# Patient Record
Sex: FEMALE | Race: WHITE | NOT HISPANIC OR LATINO | URBAN - METROPOLITAN AREA
[De-identification: names, ages, dates, MRNs, and addresses within clinical notes are randomized per-mention and may not be internally consistent; named-entity substitution may affect disease eponyms.]

---
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
## 2024-01-02 ENCOUNTER — TELEPHONE (OUTPATIENT)
Dept: OPHTHALMOLOGY | Facility: CLINIC | Age: 19
End: 2024-01-02
Payer: COMMERCIAL

## 2024-01-02 ENCOUNTER — VIRTUAL VISIT (OUTPATIENT)
Dept: PSYCHOLOGY | Facility: CLINIC | Age: 19
End: 2024-01-02
Payer: COMMERCIAL

## 2024-01-02 DIAGNOSIS — F64.9 GENDER DYSPHORIA: ICD-10-CM

## 2024-01-02 DIAGNOSIS — F41.1 GAD (GENERALIZED ANXIETY DISORDER): Primary | ICD-10-CM

## 2024-01-02 PROCEDURE — 90834 PSYTX W PT 45 MINUTES: CPT | Mod: 95 | Performed by: SOCIAL WORKER

## 2024-01-02 ASSESSMENT — ANXIETY QUESTIONNAIRES
IF YOU CHECKED OFF ANY PROBLEMS ON THIS QUESTIONNAIRE, HOW DIFFICULT HAVE THESE PROBLEMS MADE IT FOR YOU TO DO YOUR WORK, TAKE CARE OF THINGS AT HOME, OR GET ALONG WITH OTHER PEOPLE: SOMEWHAT DIFFICULT
1. FEELING NERVOUS, ANXIOUS, OR ON EDGE: SEVERAL DAYS
GAD7 TOTAL SCORE: 6
7. FEELING AFRAID AS IF SOMETHING AWFUL MIGHT HAPPEN: SEVERAL DAYS
GAD7 TOTAL SCORE: 6
3. WORRYING TOO MUCH ABOUT DIFFERENT THINGS: SEVERAL DAYS
GAD7 TOTAL SCORE: 6
7. FEELING AFRAID AS IF SOMETHING AWFUL MIGHT HAPPEN: SEVERAL DAYS
5. BEING SO RESTLESS THAT IT IS HARD TO SIT STILL: NOT AT ALL
2. NOT BEING ABLE TO STOP OR CONTROL WORRYING: SEVERAL DAYS
4. TROUBLE RELAXING: SEVERAL DAYS
6. BECOMING EASILY ANNOYED OR IRRITABLE: SEVERAL DAYS
8. IF YOU CHECKED OFF ANY PROBLEMS, HOW DIFFICULT HAVE THESE MADE IT FOR YOU TO DO YOUR WORK, TAKE CARE OF THINGS AT HOME, OR GET ALONG WITH OTHER PEOPLE?: SOMEWHAT DIFFICULT

## 2024-01-02 ASSESSMENT — PATIENT HEALTH QUESTIONNAIRE - PHQ9
SUM OF ALL RESPONSES TO PHQ QUESTIONS 1-9: 6
SUM OF ALL RESPONSES TO PHQ QUESTIONS 1-9: 6
10. IF YOU CHECKED OFF ANY PROBLEMS, HOW DIFFICULT HAVE THESE PROBLEMS MADE IT FOR YOU TO DO YOUR WORK, TAKE CARE OF THINGS AT HOME, OR GET ALONG WITH OTHER PEOPLE: SOMEWHAT DIFFICULT

## 2024-01-02 NOTE — PROGRESS NOTES
"    [facility]                                     Progress Note    Patient Name: Roby Mendoza (Evelynn)kergiuliano  Date: 24         Service Type: Individual      Session Start Time: 9:05 AM  Session End Time: 9:55 AM     Session Length: 50 minutes    Session #: 1 with this provider    Attendees: Client attended alone    Service Modality:  Video Visit:      Provider verified identity through the following two step process.  Patient provided:  Patient  and Patient address    Telemedicine Visit: The patient's condition can be safely assessed and treated via synchronous audio and visual telemedicine encounter.      Reason for Telemedicine Visit: Services only offered telehealth    Originating Site (Patient Location): Patient's home    Distant Site (Provider Location): [facility] [place]    Consent:  The patient/guardian has verbally consented to: the potential risks and benefits of telemedicine (video visit) versus in person care; bill my insurance or make self-payment for services provided; and responsibility for payment of non-covered services.     Patient would like the video invitation sent by:  My Chart    Mode of Communication:  Video Conference via [facility]    Distant Location (Provider):  On-site    As the provider I attest to compliance with applicable laws and regulations related to telemedicine.    DATA  Extended Session (53+ minutes): No  Interactive Complexity: No  Crisis: No         Progress Since Last Session (Related to Symptoms / Goals / Homework):   Symptoms:    Patient has had anxiety showing up as physical manifestations. He reports \"over excessive worrying and mental pacing and fidgeting and stuff\". He reports taking medications for two weeks, which is helping. He explained that  when he's anxious it's more difficult to talk,, he looses words, and gets fidgety, which results in irritation. He also reports that he fixates on social " situations, replaying in his mind how he could have done something differently.    Homework:  N/A, first session with this provider      Episode of Care Goals: No improvement - PREPARATION (Decided to change - considering how); Intervened by negotiating a change plan and determining options / strategies for behavior change, identifying triggers, exploring social supports, and working towards setting a date to begin behavior change     Current / Ongoing Stressors and Concerns:   Stable job, but uncertainty about their future. Challenges within roommate and housing.     Patient shared: Unsure of what to do with imedate future in life. I'd like to when I'm 40-50 have my own homestead with my own animals. Jozef ow working on [employer] and passed 3 out fo 4 fassets, taking math classes to do the last one.  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 Been at first job for 6 years. Worked at [employer] briefly, too stressfeul.     Can't see the [place] being any more stable as a country in 20 years or even 5 years. Makes it discouraging to think about my future when I don't know if I will have rights as a transgender person. Or consumerism and having to work 3 jobs for top surgery.      Treatment Objective(s) Addressed in This Session:   Patient will explore thoughts and feelings related to surgery and actions he can take towards this if he chooses, as well as exploring how societal bias may be impacting anxiety around this.    Patient will explore possible paths and next steps in their life and any underlying anxious thoughts or beliefs that prevent them from making progress on these steps.    Patient will explore how housing and living situation is impacting their mental health and what beliefs they hold around this.    Patient will use at least 5 coping skills for anxiety management in the next 10 weeks.    Patient will implement self-care into their routine to decrease anxiety,  focusing on sleep hygeine, nutrition, movement, regular engagement in mindful activity, and regular socialization.      Intervention:   Building rapport, treatment planning, CBT:  Met with client and reviewed their symptoms and current stressors. Identified treatment goals. Reviewed coping skills utilized thus far. Introduced CBT worksheet to identify situation, feelings, and thoughts.       Assessments completed prior to visit:  The following assessments were completed by patient for this visit:  PHQ9:       2/28/2023     2:53 PM 4/11/2023     9:12 AM 10/19/2023     8:15 AM 12/18/2023     9:37 AM 1/2/2024     8:43 AM   PHQ-9 SCORE   PHQ-9 Total Score MyChart    9 (Mild depression) 6 (Mild depression)   PHQ-9 Total Score 10 17 6 9 6     GAD7:       10/19/2023     8:16 AM 12/18/2023    11:00 AM 1/2/2024     8:44 AM   LEMUEL-7 SCORE   Total Score   6 (mild anxiety)   Total Score 21 5 6     PROMIS 10-Global Health (only subscores and total score):       12/18/2023    10:17 AM 1/2/2024     8:45 AM   PROMIS-10 Scores Only   Global Mental Health Score 12 10   Global Physical Health Score 15 15   PROMIS TOTAL - SUBSCORES 27 25         ASSESSMENT: Current Emotional / Mental Status (status of significant symptoms):   Risk status (Self / Other harm or suicidal ideation)   Patient denies current fears or concerns for personal safety.   Patient denies current or recent suicidal ideation or behaviors.   Patient denies current or recent homicidal ideation or behaviors.   Patient denies current or recent self injurious behavior or ideation.   Patient denies other safety concerns.   Patient reports there has been no change in risk factors since their last session.     Patient reports there has been no change in protective factors since their last session.     Recommended that patient call 911 or go to the local ED should there be a change in any of these risk factors.     Appearance:   Appropriate    Eye Contact:   Good    Psychomotor  Behavior: Normal    Attitude:   Cooperative    Orientation:   All   Speech    Rate / Production: Normal/ Responsive    Volume:  Normal    Mood:    Anxious    Affect:    Appropriate    Thought Content:  Clear    Thought Form:  Coherent  Logical    Insight:    Good      Medication Review:   No changes to current psychiatric medication(s)     Medication Compliance:   Yes     Changes in Health Issues:   None reported     Chemical Use Review:   Substance Use: Chemical use reviewed, no active concerns identified      Tobacco Use: No current tobacco use.      Diagnosis:  1. LEMUEL (generalized anxiety disorder)    2. Gender dysphoria        Collateral Reports Completed:   Not Applicable    PLAN: (Patient Tasks / Therapist Tasks / Other)  Patient to review worksheet and try at least one more before next session  Provider to gather and send resources on patient's questions on surgery        ALIX COTE, LICSW                                                         ______________________________________________________________________    Individual Treatment Plan    Patient's Name: Roby Ramirez (Evelynn)  YOB: 2005    Date of Creation: 1/2/24  Date Treatment Plan Last Reviewed/Revised: 1/2/24    DSM5 Diagnoses: 300.02 (F41.1) Generalized Anxiety Disorder or 302.85 (F64.1) Gender dysphoria in Adolescents and Adults   Psychosocial / Contextual Factors: Stable job, but uncertainty about their future. Challenges within roommate and housing. Societal concerns around transgender healthcare  PROMIS (reviewed every 90 days): PROMIS 10-Global Health (only subscores and total score):       12/18/2023    10:17 AM 1/2/2024     8:45 AM   PROMIS-10 Scores Only   Global Mental Health Score 12 10   Global Physical Health Score 15 15   PROMIS TOTAL - SUBSCORES 27 25       Referral / Collaboration:  Referral to another professional/service is not indicated at this time..    Anticipated number of session for this episode of  care: 9-12 sessions  Anticipation frequency of session: Biweekly  Anticipated Duration of each session: 38-52 minutes  Treatment plan will be reviewed in 90 days or when goals have been changed.       MeasurableTreatment Goal(s) related to diagnosis / functional impairment(s)  Goal 1: Patient will reduce overall anxiety, including anxiety around surgery and finances, housing and roommate, and future career  Patient could not articulate how they could recognize progress, but simply was able to share more ways their anxiety shows up to help develop more specific goals.    Objective #A (Patient Action)    Patient will  explore thoughts and feelings related to surgery and actions he can take towards this if he chooses, as well as exploring how societal bias may be impacting anxiety around this .  Status: New - Date: 1/2/24      Intervention(s)  Therapist will provide resources for this and a safe space to explore thoughts and feelings.      Objective #B  Patient will  explore possible paths and next steps in their life and any underlying anxious thoughts or beliefs that prevent them from making progress on these steps .  Status: New - Date: 1/2/24      Intervention(s)  Therapist will  provide CBT worksheets and exploration of underlying anxious beliefs, as well as a space to explore possible future options. Therapist will also engaged in internal family systems therapy .    Objective #C  Patient will  explore how housing and living situation is impacting their mental health and what beliefs they hold around this .  Status: New - Date: 1/2/24      Intervention(s)  Therapist will  guide client with CBT and IFS to explore this .    Objective #D (Patient Action)    Patient will use at least 5 coping skills for anxiety management in the next 10 weeks.  Status: New - Date: 1/2/24     Intervention(s)  Therapist will teach coping skills and assign homework of practicing these.    Objective #E  Patient will implement self-care  into their routine to decrease anxiety, focusing on sleep hygeine, nutrition, movement, regular engagement in mindful activity, and regular socialization.   Status: New - Date: 1/2/24     Intervention(s)  Therapist will provide psychoeducation around the benefit of self-care and help client identify mindfulness based activities that may work for their life.      Patient has not reviewed nor agreed to the above plan.      B ALIX VILLA, LICSW  January 2, 2024

## 2024-01-04 ENCOUNTER — OFFICE VISIT (OUTPATIENT)
Dept: OPHTHALMOLOGY | Facility: CLINIC | Age: 19
End: 2024-01-04
Payer: COMMERCIAL

## 2024-01-04 DIAGNOSIS — H52.13 MYOPIA OF BOTH EYES: Primary | ICD-10-CM

## 2024-01-04 PROCEDURE — 92015 DETERMINE REFRACTIVE STATE: CPT | Performed by: OPTOMETRIST

## 2024-01-04 PROCEDURE — 92004 COMPRE OPH EXAM NEW PT 1/>: CPT | Performed by: OPTOMETRIST

## 2024-01-04 ASSESSMENT — TONOMETRY
OD_IOP_MMHG: 18
OS_IOP_MMHG: 18
IOP_METHOD: ICARE

## 2024-01-04 ASSESSMENT — REFRACTION
OD_CYLINDER: +0.50
OS_SPHERE: -2.50
OS_AXIS: 083
OD_AXIS: 087
OS_CYLINDER: +0.75
OD_SPHERE: -2.00

## 2024-01-04 ASSESSMENT — REFRACTION_MANIFEST
OD_CYLINDER: +0.50
OS_SPHERE: -2.75
OD_SPHERE: -2.00
OS_AXIS: 083
OS_CYLINDER: +0.75
OD_AXIS: 087

## 2024-01-04 ASSESSMENT — REFRACTION_WEARINGRX
OS_CYLINDER: +0.75
OD_AXIS: 091
OD_SPHERE: -1.75
OS_SPHERE: -1.75
OD_CYLINDER: +0.50
OS_AXIS: 091

## 2024-01-04 ASSESSMENT — CONF VISUAL FIELD
METHOD: COUNTING FINGERS
OS_SUPERIOR_NASAL_RESTRICTION: 0
OS_NORMAL: 1
OD_SUPERIOR_TEMPORAL_RESTRICTION: 0
OD_INFERIOR_TEMPORAL_RESTRICTION: 0
OD_NORMAL: 1
OS_INFERIOR_TEMPORAL_RESTRICTION: 0
OD_INFERIOR_NASAL_RESTRICTION: 0
OD_SUPERIOR_NASAL_RESTRICTION: 0
OS_SUPERIOR_TEMPORAL_RESTRICTION: 0
OS_INFERIOR_NASAL_RESTRICTION: 0

## 2024-01-04 ASSESSMENT — SLIT LAMP EXAM - LIDS
COMMENTS: NORMAL
COMMENTS: NORMAL

## 2024-01-04 ASSESSMENT — VISUAL ACUITY
OD_CC: 20/20
OS_CC+: -2
METHOD: SNELLEN - LINEAR
CORRECTION_TYPE: GLASSES
OD_CC+: -1
OS_CC: 20/25

## 2024-01-04 ASSESSMENT — EXTERNAL EXAM - RIGHT EYE: OD_EXAM: NORMAL

## 2024-01-04 ASSESSMENT — CUP TO DISC RATIO
OS_RATIO: 0.35
OD_RATIO: 0.25

## 2024-01-04 ASSESSMENT — EXTERNAL EXAM - LEFT EYE: OS_EXAM: NORMAL

## 2024-01-04 NOTE — NURSING NOTE
Chief Complaints and History of Present Illnesses   Patient presents with    Annual Eye Exam     Chief Complaint(s) and History of Present Illness(es)       Annual Eye Exam              Laterality: both eyes    Associated symptoms: Negative for redness, jaw claudication, photophobia and foreign body sensation    Pain scale: 0/10              Comments    Patient last exam was 9 years ago. Patient's glasss are that old. Patient deies any issues at this time.  [facility] January 4, 2024 1:37 PM

## 2024-01-04 NOTE — PROGRESS NOTES
History  HPI       Annual Eye Exam    In both eyes.  Associated symptoms include Negative for redness, jaw claudication, photophobia and foreign body sensation.  Pain was noted as 0/10.             Comments    Patient last exam was 9 years ago. Patient's glasss are that old. Patient deies any issues at this time.  [facility] Barnes January 4, 2024 1:37 PM           Last edited by Renee Kowalski on 1/4/2024  1:37 PM.          Assessment/Plan  (H52.13) Myopia of both eyes  (primary encounter diagnosis)  Comment: Myopia both eyes   Plan: REFRACTION         Educated patient on condition and clinical findings. Dispensed spectacle prescription for full time wear. Monitor at next comprehensive eye exam.    Ocular health normal on examination today.    Return to clinic in 2 years for comprehensive eye exam.    Complete documentation of historical and exam elements from today's encounter can  be found in the full encounter summary report (not reduplicated in this progress  note). I personally obtained the chief complaint(s) and history of present illness. I  confirmed and edited as necessary the review of systems, past medical/surgical  history, family history, social history, and examination findings as documented by  others; and I examined the patient myself. I personally reviewed the relevant tests,  images, and reports as documented above. I formulated and edited as necessary the  assessment and plan and discussed the findings and management plan with the  patient and family.    Eduar Shannon OD, FAAO

## 2024-02-05 ENCOUNTER — VIRTUAL VISIT (OUTPATIENT)
Dept: PSYCHOLOGY | Facility: CLINIC | Age: 19
End: 2024-02-05
Payer: COMMERCIAL

## 2024-02-05 DIAGNOSIS — F41.1 GAD (GENERALIZED ANXIETY DISORDER): Primary | ICD-10-CM

## 2024-02-05 DIAGNOSIS — F64.9 GENDER DYSPHORIA: ICD-10-CM

## 2024-02-05 PROCEDURE — 90834 PSYTX W PT 45 MINUTES: CPT | Mod: 95 | Performed by: SOCIAL WORKER

## 2024-02-05 ASSESSMENT — ANXIETY QUESTIONNAIRES
IF YOU CHECKED OFF ANY PROBLEMS ON THIS QUESTIONNAIRE, HOW DIFFICULT HAVE THESE PROBLEMS MADE IT FOR YOU TO DO YOUR WORK, TAKE CARE OF THINGS AT HOME, OR GET ALONG WITH OTHER PEOPLE: SOMEWHAT DIFFICULT
GAD7 TOTAL SCORE: 7
5. BEING SO RESTLESS THAT IT IS HARD TO SIT STILL: SEVERAL DAYS
7. FEELING AFRAID AS IF SOMETHING AWFUL MIGHT HAPPEN: SEVERAL DAYS
GAD7 TOTAL SCORE: 7
6. BECOMING EASILY ANNOYED OR IRRITABLE: SEVERAL DAYS
3. WORRYING TOO MUCH ABOUT DIFFERENT THINGS: SEVERAL DAYS
2. NOT BEING ABLE TO STOP OR CONTROL WORRYING: SEVERAL DAYS
GAD7 TOTAL SCORE: 7
1. FEELING NERVOUS, ANXIOUS, OR ON EDGE: SEVERAL DAYS
8. IF YOU CHECKED OFF ANY PROBLEMS, HOW DIFFICULT HAVE THESE MADE IT FOR YOU TO DO YOUR WORK, TAKE CARE OF THINGS AT HOME, OR GET ALONG WITH OTHER PEOPLE?: SOMEWHAT DIFFICULT
4. TROUBLE RELAXING: SEVERAL DAYS
7. FEELING AFRAID AS IF SOMETHING AWFUL MIGHT HAPPEN: SEVERAL DAYS

## 2024-02-05 ASSESSMENT — PATIENT HEALTH QUESTIONNAIRE - PHQ9
SUM OF ALL RESPONSES TO PHQ QUESTIONS 1-9: 4
10. IF YOU CHECKED OFF ANY PROBLEMS, HOW DIFFICULT HAVE THESE PROBLEMS MADE IT FOR YOU TO DO YOUR WORK, TAKE CARE OF THINGS AT HOME, OR GET ALONG WITH OTHER PEOPLE: SOMEWHAT DIFFICULT
SUM OF ALL RESPONSES TO PHQ QUESTIONS 1-9: 4

## 2024-02-05 NOTE — PROGRESS NOTES
[facility]                                     Progress Note    Patient Name: Roby Ramirez (Evelynn)  Date: 2/5/24         Service Type: Individual      Session Start Time: 1:40 PM  Session End Time: 2:20 PM     Session Length: 50 minutes    Session #: 2    Attendees: Client attended alone    Service Modality:  Video Visit:      Provider verified identity through the following two step process.  Patient provided:  Patient is known previously to provider    Telemedicine Visit: The patient's condition can be safely assessed and treated via synchronous audio and visual telemedicine encounter.      Reason for Telemedicine Visit: Patient convenience (e.g. access to timely appointments / distance to available provider)    Originating Site (Patient Location): Patient's other [facility]    Distant Site (Provider Location): Provider Remote Setting- Home Office    Consent:  The patient/guardian has verbally consented to: the potential risks and benefits of telemedicine (video visit) versus in person care; bill my insurance or make self-payment for services provided; and responsibility for payment of non-covered services.     Patient would like the video invitation sent by:  My Chart    Mode of Communication:  Video Conference via Amwell    Distant Location (Provider):  On-site    As the provider I attest to compliance with applicable laws and regulations related to telemedicine.    DATA  Extended Session (53+ minutes): No  Interactive Complexity: No  Crisis: No         Progress Since Last Session (Related to Symptoms / Goals / Homework):   Symptoms:  Patient has had some anxiety as he moved out of his last apartment.    Homework:  Progress made, some goals shifted due to changing life stressors  Patient to review worksheet and try at least one more before next session -not done  Provider to gather and send resources on patient's questions on surgery -done        Episode of  Care Goals: No improvement - PREPARATION (Decided to change - considering how); Intervened by negotiating a change plan and determining options / strategies for behavior change, identifying triggers, exploring social supports, and working towards setting a date to begin behavior change     Current / Ongoing Stressors and Concerns:   Stable job, but uncertainty about their future. Challenges within roommate and housing which led to moving out and in with father, but is applying for places with a new roommate.      Treatment Objective(s) Addressed in This Session:   Patient will explore thoughts and feelings related to surgery and actions he can take towards this if he chooses, as well as exploring how societal bias may be impacting anxiety around this.    Patient will explore possible paths and next steps in their life and any underlying anxious thoughts or beliefs that prevent them from making progress on these steps.    Patient will explore how housing and living situation is impacting their mental health and what beliefs they hold around this.    Patient will use at least 5 coping skills for anxiety management in the next 10 weeks.    Patient will implement self-care into their routine to decrease anxiety, focusing on sleep hygeine, nutrition, movement, regular engagement in mindful activity, and regular socialization.      Intervention:   CBT:  Processed moving out. 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 Talked about anxiety and how he's currently using distraction as a technique to manage it. Talked about some of the stressors with finances, slowing down and noticing how these worries were impacting him. Discussed how worry shows up for him, specifically that if he doesn't worry he will forget about something important.     Processed childhood, exploring emotions towards his mother with her kicking him out at 16 and how this impacted their relationship and his relationship with his  father. Explored family mental health history. Discussed missing his relationship with his grandmother and set goals around rebuilding this.       Assessments completed prior to visit:  The following assessments were completed by patient for this visit:  PHQ9:       2/28/2023     2:53 PM 4/11/2023     9:12 AM 10/19/2023     8:15 AM 12/18/2023     9:37 AM 1/2/2024     8:43 AM 2/5/2024     1:40 PM   PHQ-9 SCORE   PHQ-9 Total Score MyChart    9 (Mild depression) 6 (Mild depression) 4 (Minimal depression)   PHQ-9 Total Score 10 17 6 9 6 4     GAD7:       10/19/2023     8:16 AM 12/18/2023    11:00 AM 1/2/2024     8:44 AM 2/5/2024     1:40 PM   LEMUEL-7 SCORE   Total Score   6 (mild anxiety) 7 (mild anxiety)   Total Score 21 5 6 7     PROMIS 10-Global Health (only subscores and total score):       12/18/2023    10:17 AM 1/2/2024     8:45 AM   PROMIS-10 Scores Only   Global Mental Health Score 12 10   Global Physical Health Score 15 15   PROMIS TOTAL - SUBSCORES 27 25         ASSESSMENT: Current Emotional / Mental Status (status of significant symptoms):   Risk status (Self / Other harm or suicidal ideation)   Patient denies current fears or concerns for personal safety.   Patient denies current or recent suicidal ideation or behaviors.   Patient denies current or recent homicidal ideation or behaviors.   Patient denies current or recent self injurious behavior or ideation.   Patient denies other safety concerns.   Patient reports there has been no change in risk factors since their last session.     Patient reports there has been no change in protective factors since their last session.     Recommended that patient call 911 or go to the local ED should there be a change in any of these risk factors.     Appearance:   Appropriate    Eye Contact:   Good    Psychomotor Behavior: Normal    Attitude:   Cooperative    Orientation:   All   Speech    Rate / Production: Normal/ Responsive    Volume:  Normal    Mood:    Anxious     Affect:    Appropriate    Thought Content:  Clear    Thought Form:  Coherent  Logical    Insight:    Good      Medication Review:   No changes to current psychiatric medication(s)     Medication Compliance:   Yes     Changes in Health Issues:   None reported     Chemical Use Review:   Substance Use: Chemical use reviewed, no active concerns identified      Tobacco Use: No current tobacco use.      Diagnosis:  1. LEMUEL (generalized anxiety disorder)    2. Gender dysphoria      Collateral Reports Completed:   Not Applicable    PLAN: (Patient Tasks / Therapist Tasks / Other)  Call grandmother for connection  Look into state insurance  Patient to review worksheet and try at least one more before next session        ALIX COTE, [facility]    February 5, 2024                                                         ______________________________________________________________________    Individual Treatment Plan    Patient's Name: Roby Ramirez (Evelynn)  YOB: 2005    Date of Creation: 1/2/24  Date Treatment Plan Last Reviewed/Revised: 1/2/24    DSM5 Diagnoses: 300.02 (F41.1) Generalized Anxiety Disorder or 302.85 (F64.1) Gender dysphoria in Adolescents and Adults   Psychosocial / Contextual Factors: Stable job, but uncertainty about their future. Challenges within roommate and housing. Societal concerns around transgender healthcare  PROMIS (reviewed every 90 days): PROMIS 10-Global Health (only subscores and total score):       12/18/2023    10:17 AM 1/2/2024     8:45 AM   PROMIS-10 Scores Only   Global Mental Health Score 12 10   Global Physical Health Score 15 15   PROMIS TOTAL - SUBSCORES 27 25       Referral / Collaboration:  Referral to another professional/service is not indicated at this time..    Anticipated number of session for this episode of care: 9-12 sessions  Anticipation frequency of session: Biweekly  Anticipated Duration of each session: 38-52 minutes  Treatment plan will be  reviewed in 90 days or when goals have been changed.       MeasurableTreatment Goal(s) related to diagnosis / functional impairment(s)  Goal 1: Patient will reduce overall anxiety, including anxiety around surgery and finances, housing and roommate, and future career  Patient could not articulate how they could recognize progress, but simply was able to share more ways their anxiety shows up to help develop more specific goals.    Objective #A (Patient Action)    Patient will  explore thoughts and feelings related to surgery and actions he can take towards this if he chooses, as well as exploring how societal bias may be impacting anxiety around this .  Status: New - Date: 1/2/24      Intervention(s)  Therapist will provide resources for this and a safe space to explore thoughts and feelings.      Objective #B  Patient will  explore possible paths and next steps in their life and any underlying anxious thoughts or beliefs that prevent them from making progress on these steps .  Status: New - Date: 1/2/24      Intervention(s)  Therapist will  provide CBT worksheets and exploration of underlying anxious beliefs, as well as a space to explore possible future options. Therapist will also engaged in internal family systems therapy .    Objective #C  Patient will  explore how housing and living situation is impacting their mental health and what beliefs they hold around this .  Status: New - Date: 1/2/24      Intervention(s)  Therapist will  guide client with CBT and IFS to explore this .    Objective #D (Patient Action)    Patient will use at least 5 coping skills for anxiety management in the next 10 weeks.  Status: New - Date: 1/2/24     Intervention(s)  Therapist will teach coping skills and assign homework of practicing these.    Objective #E  Patient will implement self-care into their routine to decrease anxiety, focusing on sleep hygeine, nutrition, movement, regular engagement in mindful activity, and regular  socialization.   Status: New - Date: 1/2/24     Intervention(s)  Therapist will provide psychoeducation around the benefit of self-care and help client identify mindfulness based activities that may work for their life.      Patient has reviewed and agreed to the above plan.      B ALIX VILLA, [facility]  January 2, 2024

## 2024-02-09 ENCOUNTER — OFFICE VISIT (OUTPATIENT)
Dept: FAMILY MEDICINE | Facility: CLINIC | Age: 19
End: 2024-02-09
Payer: COMMERCIAL

## 2024-02-09 VITALS
HEIGHT: 67 IN | TEMPERATURE: 98.5 F | WEIGHT: 199.7 LBS | HEART RATE: 83 BPM | DIASTOLIC BLOOD PRESSURE: 82 MMHG | RESPIRATION RATE: 18 BRPM | SYSTOLIC BLOOD PRESSURE: 123 MMHG | BODY MASS INDEX: 31.34 KG/M2 | OXYGEN SATURATION: 97 %

## 2024-02-09 DIAGNOSIS — S43.402A SPRAIN OF LEFT SHOULDER, UNSPECIFIED SHOULDER SPRAIN TYPE, INITIAL ENCOUNTER: Primary | ICD-10-CM

## 2024-02-09 PROCEDURE — 99213 OFFICE O/P EST LOW 20 MIN: CPT | Performed by: FAMILY MEDICINE

## 2024-02-09 RX ORDER — CYCLOBENZAPRINE HCL 10 MG
10 TABLET ORAL 3 TIMES DAILY PRN
Qty: 15 TABLET | Refills: 0 | Status: SHIPPED | OUTPATIENT
Start: 2024-02-09 | End: 2024-09-06

## 2024-02-09 ASSESSMENT — ASTHMA QUESTIONNAIRES
QUESTION_3 LAST FOUR WEEKS HOW OFTEN DID YOUR ASTHMA SYMPTOMS (WHEEZING, COUGHING, SHORTNESS OF BREATH, CHEST TIGHTNESS OR PAIN) WAKE YOU UP AT NIGHT OR EARLIER THAN USUAL IN THE MORNING: ONCE OR TWICE
QUESTION_1 LAST FOUR WEEKS HOW MUCH OF THE TIME DID YOUR ASTHMA KEEP YOU FROM GETTING AS MUCH DONE AT WORK, SCHOOL OR AT HOME: A LITTLE OF THE TIME
QUESTION_2 LAST FOUR WEEKS HOW OFTEN HAVE YOU HAD SHORTNESS OF BREATH: THREE TO SIX TIMES A WEEK
QUESTION_4 LAST FOUR WEEKS HOW OFTEN HAVE YOU USED YOUR RESCUE INHALER OR NEBULIZER MEDICATION (SUCH AS ALBUTEROL): TWO OR THREE TIMES PER WEEK
ACT_TOTALSCORE: 18
QUESTION_5 LAST FOUR WEEKS HOW WOULD YOU RATE YOUR ASTHMA CONTROL: WELL CONTROLLED
ACT_TOTALSCORE: 18

## 2024-02-09 ASSESSMENT — PAIN SCALES - GENERAL: PAINLEVEL: SEVERE PAIN (7)

## 2024-02-09 NOTE — PROGRESS NOTES
"  Assessment & Plan     Sprain of left shoulder, unspecified shoulder sprain, initial encounter  Acute, likely sprain of trapezius, possibly SCM muscles. Given short duration, recommend rest, ice, stretches. Given severity of pain, OK to use muscle relaxants at night. Otherwise recommending PRN ibuprofen 600 mg PO q6H.   - REVIEW OF HEALTH MAINTENANCE PROTOCOL ORDERS  - cyclobenzaprine (FLEXERIL) 10 MG tablet  Dispense: 15 tablet; Refill: 0      Prescription drug management  I spent a total of 14 minutes on the day of the visit.   Time spent by me doing chart review, history and exam, documentation and further activities per the note      BMI  Estimated body mass index is 31.28 kg/m  as calculated from the following:    Height as of this encounter: 1.702 m (5' 7\").    Weight as of this encounter: 90.6 kg (199 lb 11.2 oz).   Weight management plan: Discussed healthy diet and exercise guidelines      See Patient Instructions    Lolis Ca is a 18 year old, presenting for the following health issues:  Neck Pain (Pt states that the pain is coming from the left ) and Shoulder Pain        2/9/2024     9:06 AM   Additional Questions   Roomed by Carlos ZHU   Accompanied by N/A     History of Present Illness       Reason for visit:  Neck/shoulder pain  Symptom onset:  1-2 weeks ago    He eats 2-3 servings of fruits and vegetables daily.He consumes 0 sweetened beverage(s) daily.He exercises with enough effort to increase his heart rate 10 to 19 minutes per day.  He exercises with enough effort to increase his heart rate 3 or less days per week.   He is taking medications regularly.       Concern - Neck/Shoulder Pain on the Left Side   Onset: 1-2 weeks   Description: Consistent pain, achy. Posterior neck down the trapezius.   Intensity: moderate  Progression of Symptoms:  worsening, now constant  Accompanying Signs & Symptoms: Pain, achy-ness, discomfort, lack of sleep   Previous history of similar problem: No " "  Precipitating factors:        Worsened by: Moving things into a storage unit (how initially started). Turning or looking up or down   Alleviating factors:        Improved by: Tried stretches, different sleep positions.   Therapies tried and outcome: None    Does not play sports, no history of neck problems, does not see a chiropractor. Denies any weakness in . No numbness or tingling.         Objective    /82 (BP Location: Right arm, Patient Position: Sitting, Cuff Size: Adult Large)   Pulse 83   Temp 98.5  F (36.9  C) (Oral)   Resp 18   Ht 1.702 m (5' 7\")   Wt 90.6 kg (199 lb 11.2 oz)   SpO2 97%   BMI 31.28 kg/m    Body mass index is 31.28 kg/m .  Physical Exam  Vitals reviewed.   Constitutional:       Appearance: Normal appearance.   Musculoskeletal:      Right shoulder: Normal.      Left shoulder: No swelling, tenderness or crepitus. Normal range of motion. Normal strength.      Cervical back: Normal range of motion and neck supple. Tenderness present. No rigidity. Pain with movement and muscular tenderness present. No spinous process tenderness.      Comments: Negative Gilman, Negative Neer's    Neurological:      Mental Status: He is alert.                  Signed Electronically by: ANASTASIA KEATING DO    "

## 2024-02-09 NOTE — PATIENT INSTRUCTIONS
Do not mix ibuprofen and naproxen or naprosyn. These brand names include motrin, aleve, advil. Take 400-600 mg by mouth every 4-6 hours. Eat with food as it can cause stomach upset.   You can take the muscle relaxants as needed for severe pain.

## 2024-03-06 DIAGNOSIS — F99 INSOMNIA DUE TO OTHER MENTAL DISORDER: ICD-10-CM

## 2024-03-06 DIAGNOSIS — F41.1 GENERALIZED ANXIETY DISORDER: ICD-10-CM

## 2024-03-06 DIAGNOSIS — F51.05 INSOMNIA DUE TO OTHER MENTAL DISORDER: ICD-10-CM

## 2024-03-07 RX ORDER — CLONIDINE HYDROCHLORIDE 0.1 MG/1
0.1 TABLET ORAL AT BEDTIME
Qty: 90 TABLET | Refills: 0 | Status: SHIPPED | OUTPATIENT
Start: 2024-03-07 | End: 2024-06-06

## 2024-03-07 NOTE — TELEPHONE ENCOUNTER
"Request for medication refill:  cloNIDine (CATAPRES) 0.1 MG tablet     Providers if patient needs an appointment and you are willing to give a one month supply please refill for one month and  send a letter/MyChart using \".SMILLIMITEDREFILL\" .smillimited and route chart to \"P [facility] \" (Giving one month refill in non controlled medications is strongly recommended before denial)    If refill has been denied, meaning absolutely no refills without visit, please complete the smart phrase \".smirxrefuse\" and route it to the \"P [facility] MED REFILLS\"  pool to inform the patient and the pharmacy.    Rajan Medina, [facility]      "

## 2024-03-13 ENCOUNTER — FCC EXTENDED DOCUMENTATION (OUTPATIENT)
Dept: PSYCHOLOGY | Facility: CLINIC | Age: 19
End: 2024-03-13
Payer: COMMERCIAL

## 2024-03-13 NOTE — PROGRESS NOTES
"                    Discharge Summary  Multiple Sessions    Client Name: Susan Ramirez MRN#: [id number] YOB: 2005      Intake / Discharge Date: 3/13/24      DSM5 Diagnoses: (Sustained by DSM5 Criteria Listed Above)  Diagnoses: 300.02 (F41.1) Generalized Anxiety Disorder  302.85 (F64.1) Gender dysphoria in Adolescents and Adults    Psychosocial & Contextual Factors:  single, lives with roommate, employed fulltime.   WHODAS 2.0 (12 item) Score: N/A, tool is no  longer being utilized          Presenting Concern:  Patient reported \"relating to anxiety disorder\"      Reason for Discharge:  Provider is leaving organization.       Disposition at Time of Last Encounter:   Comments:   Provider leaving, patient has not returned multiple phone calls or DNA Guidet message, Yub message has been viewed.       Risk Management:   Client denies a history of suicidal ideation, suicide attempts, self-injurious behavior, homicidal ideation, homicidal behavior, and and other safety concerns  Recommended that patient call 911 or go to the local ED should there be a change in any of these risk factors.      Referred To:  Patient may return to [facility] at anytime. Michelle Goldstein would be a good fit for this patient. Outside resources include [facility] and [facility]        ALIX COTE, [facility]   3/13/2024   "

## 2024-04-26 ENCOUNTER — TELEPHONE (OUTPATIENT)
Dept: PLASTIC SURGERY | Facility: CLINIC | Age: 19
End: 2024-04-26
Payer: COMMERCIAL

## 2024-04-26 DIAGNOSIS — F64.0 GENDER DYSPHORIA IN ADULT: Primary | ICD-10-CM

## 2024-04-26 NOTE — TELEPHONE ENCOUNTER
[facility]    Phone Message    May a detailed message be left on voicemail: yes     Reason for Call: Appointment Intake    Referring Provider Name: Self   Diagnosis and/or Symptoms: Gender Care Top Surgery Pronouns He/Him     Action Taken: Message routed to:  [facility] Care     Travel Screening: Not Applicable

## 2024-04-30 ENCOUNTER — TELEPHONE (OUTPATIENT)
Dept: FAMILY MEDICINE | Facility: CLINIC | Age: 19
End: 2024-04-30

## 2024-04-30 DIAGNOSIS — F64.9 GENDER DYSPHORIA: Primary | ICD-10-CM

## 2024-04-30 NOTE — TELEPHONE ENCOUNTER
[facility] phone call message- patient requesting to speak with PCP or provider:    PCP: Valentina Junior    Additional Comments: The patient would like a call back to discuss TOP surgery.    Is a call back needed? Yes    Patient informed that it may take up to 2 business days to hear back from PCP:Yes    OK to leave a message on voice mail? Yes    Primary language: English      needed? No    Call taken on April 30, 2024 at 9:54 AM by Sandra Gasca

## 2024-04-30 NOTE — CONFIDENTIAL NOTE
[facility] :  Care Coordination Note     SITUATION   Roby Ramirez (he/him) is a 18 year old adult who is receiving support for:  Appointment (Gender Care Top Surgery ) and Care Team  .    BACKGROUND     Pt is scheduled for a gender affirming mastectomy consult with Dr. Rhodes on 12/6/24.     ASSESSMENT     Surgery              C[facility] Assessment  [facility] ([facility] Enrollment: (P) Enrolled  Patient has a therapist: (P) No  Letter of support #1: (P) Requested  Surgery being considered: (P) Yes  Mastectomy: (P) Yes    Pt reports:   No nicotine or other gender affirming surgeries  PLAN          Nursing Interventions:       Follow-up plan:  1. Establish with  provider and obtain ARNAV De La Garza

## 2024-05-02 ENCOUNTER — TELEPHONE (OUTPATIENT)
Dept: PSYCHOLOGY | Facility: CLINIC | Age: 19
End: 2024-05-02
Payer: COMMERCIAL

## 2024-05-02 DIAGNOSIS — F64.0 GENDER DYSPHORIA IN ADULT: Primary | ICD-10-CM

## 2024-05-02 NOTE — TELEPHONE ENCOUNTER
Date: 2024    Client Name:  Susan Ramirez  Preferred Name: Roby  MRN: [id number]   : 2005  Age:  18 year old    Presenting Problem / Reason for Assessment:     Letter of support for upcoming surgery       Suggested Program:  TG    Interested in Couples/Family Therapy?  No    Any current or past legal concerns we would need to know about?:   No      Patient wishes to be contacted regarding Insurance benefits?:  No    Insurance Benefits to be evaluated.  Note will be entered when validated.    Please Verify Registration    Please send Welcome Packet and document date sent.

## 2024-06-04 ENCOUNTER — MYC REFILL (OUTPATIENT)
Dept: FAMILY MEDICINE | Facility: CLINIC | Age: 19
End: 2024-06-04
Payer: COMMERCIAL

## 2024-06-04 DIAGNOSIS — F41.1 GENERALIZED ANXIETY DISORDER: ICD-10-CM

## 2024-06-04 DIAGNOSIS — F51.05 INSOMNIA DUE TO OTHER MENTAL DISORDER: ICD-10-CM

## 2024-06-04 DIAGNOSIS — F99 INSOMNIA DUE TO OTHER MENTAL DISORDER: ICD-10-CM

## 2024-06-04 NOTE — TELEPHONE ENCOUNTER
"Request for medication refill: cloNIDine (CATAPRES) 0.1 MG tablet     Providers if patient needs an appointment and you are willing to give a one month supply please refill for one month and  send a letter/MyChart using \".SMILLIMITEDREFILL\" .smillimited and route chart to \"P [facility] \" (Giving one month refill in non controlled medications is strongly recommended before denial)    If refill has been denied, meaning absolutely no refills without visit, please complete the smart phrase \".smirxrefuse\" and route it to the \"P [facility] MED REFILLS\"  pool to inform the patient and the pharmacy.    Cristel Ramos, CMA    "

## 2024-06-06 RX ORDER — CLONIDINE HYDROCHLORIDE 0.1 MG/1
0.1 TABLET ORAL AT BEDTIME
Qty: 90 TABLET | Refills: 3 | Status: SHIPPED | OUTPATIENT
Start: 2024-06-06 | End: 2024-06-10

## 2024-06-06 RX ORDER — CLONIDINE HYDROCHLORIDE 0.1 MG/1
0.1 TABLET ORAL AT BEDTIME
Qty: 90 TABLET | Refills: 0 | OUTPATIENT
Start: 2024-06-06

## 2024-06-06 NOTE — TELEPHONE ENCOUNTER
"Request for medication refill:    Providers if patient needs an appointment and you are willing to give a one month supply please refill for one month and  send a letter/MyChart using \".SMILLIMITEDREFILL\" .smillimited and route chart to \"P [facility] \" (Giving one month refill in non controlled medications is strongly recommended before denial)    If refill has been denied, meaning absolutely no refills without visit, please complete the smart phrase \".smirxrefuse\" and route it to the \"P [facility] MED REFILLS\"  pool to inform the patient and the pharmacy.    Leticia Lopez RN      " 25F PMH anorexia, p/w feeling cold/fatigue. Pt states that she was feeling called tonight, took her temp and was low at 94, so called EMS. Occasional minimal nausea. Also states that she has been following w/ PMD Dr. Saenz and recent labs have noted "low WBC and low Na" slowly declining, unsure of exact numbers. Started on Na tablets. Her PMD presumes 2/2 eating disorder. Her psychiatrist increased zyprexa dose and started on 1mg klonopin qhs ~1w ago. Since then pt states she is waking up much later in the day. Denies HA, fevers, URI symptoms, SOB/CP, vomiting diarrhea, urinary complaints, black/bloody stool, LE pain/swelling.   When asked about mood she states "its ok." Denies SI/HI, toxic ingestions. Unknown LMP, occasionally irregular. 25F PMH anorexia, p/w feeling cold/fatigue. Pt states that she was feeling cold tonight, took her temp and was low at 94, so called EMS. Occasional minimal nausea. Also states that she has been following w/ PMD Dr. Saenz and recent labs have noted "low WBC and low Na" slowly declining, unsure of exact numbers. Started on Na tablets. Her PMD presumes 2/2 eating disorder. Her psychiatrist increased zyprexa dose and started on 1mg klonopin qhs ~1w ago. Since then pt states she is waking up much later in the day. Denies HA, fevers, URI symptoms, SOB/CP, vomiting diarrhea, urinary complaints, black/bloody stool, LE pain/swelling.   When asked about mood she states "its ok." Denies SI/HI, toxic ingestions. Unknown LMP, occasionally irregular.

## 2024-06-07 DIAGNOSIS — F41.1 GENERALIZED ANXIETY DISORDER: ICD-10-CM

## 2024-06-07 DIAGNOSIS — F51.05 INSOMNIA DUE TO OTHER MENTAL DISORDER: ICD-10-CM

## 2024-06-07 DIAGNOSIS — F99 INSOMNIA DUE TO OTHER MENTAL DISORDER: ICD-10-CM

## 2024-06-10 NOTE — TELEPHONE ENCOUNTER
Request for this medication sent to Dr. Junior, waiting for a reply.    Do not deny this medication until Dr. Junior has approved the other request please.    Leticia Lopez RN

## 2024-06-11 RX ORDER — CLONIDINE HYDROCHLORIDE 0.1 MG/1
0.1 TABLET ORAL AT BEDTIME
Qty: 90 TABLET | Refills: 0 | OUTPATIENT
Start: 2024-06-11

## 2024-06-20 ENCOUNTER — VIRTUAL VISIT (OUTPATIENT)
Dept: PSYCHOLOGY | Facility: CLINIC | Age: 19
End: 2024-06-20
Payer: COMMERCIAL

## 2024-06-20 DIAGNOSIS — F64.9 GENDER DYSPHORIA: ICD-10-CM

## 2024-06-20 PROCEDURE — 90791 PSYCH DIAGNOSTIC EVALUATION: CPT | Mod: 95

## 2024-06-20 NOTE — NURSING NOTE
Is the patient currently in the state of [place]? YES    Visit mode:VIDEO    If the visit is dropped, the patient can be reconnected by: VIDEO VISIT: Text to cell phone:   Telephone Information:   Mobile [phone]       Will anyone else be joining the visit? NO  (If patient encounters technical issues they should call [phone]    How would you like to obtain your AVS? MyChart    Are changes needed to the allergy or medication list? No    Are refills needed on medications prescribed by this physician? NO    Reason for visit: Consult    Grey FUENTES

## 2024-06-20 NOTE — PROGRESS NOTES
[facility]  Program in Human Sexuality  Department of Family Medicine & Community Health  [facility]   [address]               [place] [address]  Phone: [phone]  Fax: [phone]  [email or web address]    Transgender Diagnostic (TG) Diagnostic Assessment Interview      Date of Service: 6/20/24  Client Name: Roby Ramirez   YOB: 2005  Age: 18 year old  MRN:  [id number]  Gender/Gender Identity: Trans male  Pronouns: He/Him  Treating Provider: ROSY Currie, JORDEN  Program: TG  Type of Session: Assessment  Present in Session: Roby  Number of Minutes:  54    SERVICE MODALITY:  Video Visit:      Provider verified identity through the following two step process.  Patient provided:  Patient was verified at admission/transfer    Telemedicine Visit: The patient's condition can be safely assessed and treated via synchronous audio and visual telemedicine encounter.      Reason for Telemedicine Visit: Patient has requested telehealth visit and Patient convenience (e.g. access to timely appointments / distance to available provider)    Originating Site (Patient Location): Patient's home    Distant Site (Provider Location): Provider Remote Setting- Home Office    Consent:  The patient/guardian has verbally consented to: the potential risks and benefits of telemedicine (video visit) versus in person care; bill my insurance or make self-payment for services provided; and responsibility for payment of non-covered services.     Patient would like the video invitation sent by:  My Chart    Mode of Communication:  Video Conference via Amwell    Distant Location (Provider):  Off-site    As the provider I attest to compliance with applicable laws and regulations related to telemedicine.    Ethnicity:       Any relevant cultural/Gnosticism issues?   -     Referral Source   Therapist     Primary Complaint/Presenting Problem and  "Goals:  LOS top surgery - potential for long term therapy      ________________________________________________________________      Transgender Health Services  Program-Specific Information    History of gender dysphoria   When was the first time you felt your gender did not fit your sex assigned at birth?   Beginning of puberty was not on board with any of it. Was not having the cutesy stuff my biological mom put me in. Dresses, the way my hair looked, pictures.     What do you recall thinking/feeling about this? Did you tell anyone?   Just very irritated or finding ways to get around it. At school it wasn't really so much an issue. I was just very dissociated during that time. I only had the one best friend who knew all of this. When I was in high school I socially transitioned - I had all my friends using that name. I don't think a lot of teachers were using it. The time I did start telling them it was COVID and I ended up dropping out because of what was going on at home. Yeah, it gradually kind of happened and one day I was like \"I would use this name if I didn't use this\". Coming out to dad (15/16) - with friends (13/14)  he didn't really use it until we lived together. LILLIAN Dad came out to khalil and poly.     What are your earliest memories of how you felt about your birth assigned sex/gender?   No issues at current school     What messages did you receive from others (family, friends, classmates) about gender?   My grandparents on my dad's side are supportive and my uncles and their kids, as well as my dad. Mom's side is whose knows - went no contact 3 years. Immense relief that no contact is in place.     When did you start to explore your gender through different gender expression? Dress, grooming, gender  play ?   Into the beginning of middle school and early on is when my mom stopped trying to fit me into a box so that's when I started to dress more androgynous.     Body Image/Anatomical dysphoria:  How " do you feel about your body?   Pretty neutral. I like the things that testosterone is doing and I just want to be rid of these. Been on HRT for a little under 2 years.     What was puberty like for you? When did you start puberty, and how did you feel about it at the time?   Awful.    What were your reactions to your secondary sex characteristics (breasts, menses, pubic hair, facial hair, voice deepening)?  Awful.    How do you feel about your genitals? Self-mutilation?   Yes, but don't want surgery.     What would you like to see changed?  Top surgery.    What medical interventions are you interested in for transition, if any? What questions do you have about these?  Top surgery.     Does dysphoria impact your ability to be sexual?   Yes.     Social Supports:   Who are your social supports?  Job - specifically supervisors and , dad, step mom, and my household - my best friend / their fiance - rest of friend [place].    Have you disclosed to family/friends?   Yes.     Do you know any Trans people?   Yes.     How have family/friends responded?  Josi, a couple months after the divorce her mother, my grandmother stopped calling. She's a very old woman.     Any employment concerns around gender?   No.    Internalized transphobia:  Negative experiences with being transgender?  Casual slurs on bus. When I was younger there was a lot of push on like you aren't really trans if you don't have dysphoria or if you don't do XYZ.    Feelings about the transgender community?  Very positive, I was pretty in my own under a rock as a young person and it wasn't until the past two years that I've met a lot of people.     How do you feel about your transgender identity?  Positive.     Relevant Sexuality Information:  How do you feel gender impacts your sexuality?  Yes.     Any sexual functioning concerns? Desire, arousal, orgasm, pain?   No.     Do you feel gender dysphoria impacts your sexuality? How?  Yes.  "    Sexual Orientation Questions or concerns?   Ace / [place] leaning towards foster.    _________________________________________________________________________      Mental Health History   Generalized Anxiety Disorder / Family hx of Schizophrenia      Substance Use:   Rarely - if so it's social.      Medical History   No    Family History   Parent: Amandeep, 31yo. Neurodivergence / Depression  Parent: Josi, almost or around 31yo. - Bipolar Disorder. /  Schizophrenia  Step Parent: Mary Ann, early 30's.    Dad: Pretty good right now, we're not very good at communication - close with Dad.   Josi: no contact for past 2-3 years. Strained, resentment, there were periods where we got along but it was overshadowed by other dynamics.     Roommates: living with best friend and fiance - best friend also has a sibling and they're like my sibling. They have kids so I'm kind of like their uncle.     Educational History   GED right now  [employer]    Occupational history  [employer].      Legal Issues:  None    _________________________________________________________________________    CONCLUSIONS    Strengths and Liabilities:       Symptoms:      Mental Status   Appearance:  No apparent distress, Casually dressed, Well groomed, and Appears stated age  Behavior/relationship to examiner/demeanor:  Cooperative, Engaged, and Pleasant  Orientation: Oriented to person, place, time, and situation  Speech Rate:  Normal  Speech Spontaneity:  Normal  Mood:  \"good\"  Affect:  Appropriate/mood-congruent  Thought Process (Associations):  Logical, Linear, and Goal directed  Thought Content:  no evidence of suicidal or homicidal ideation, no overt psychosis, denies suicidal ideation, intent or thoughts, and patient denies auditory and visual hallucinations  Abnormal Perception:  None  Attention/Concentration:  Normal  Language:  Intact  Insight:  Good  Judgment:  Good      DSM-5 Diagnosis:  F64.0 Gender Dysphoria    Interactive " Complexity  Communication difficulties present during current the psychiatric procedure included:  N/A

## 2024-06-23 ENCOUNTER — HEALTH MAINTENANCE LETTER (OUTPATIENT)
Age: 19
End: 2024-06-23

## 2024-07-17 ENCOUNTER — OFFICE VISIT (OUTPATIENT)
Dept: PSYCHOLOGY | Facility: CLINIC | Age: 19
End: 2024-07-17
Payer: COMMERCIAL

## 2024-07-17 DIAGNOSIS — F64.9 GENDER DYSPHORIA: Primary | ICD-10-CM

## 2024-07-17 PROCEDURE — 90834 PSYTX W PT 45 MINUTES: CPT

## 2024-07-17 NOTE — PROGRESS NOTES
[facility] - Progress Note    Date of Service: 24   Name: Roby Ramirez  : 2005  Medical Record Number: [id number]  Treating Provider: JORDEN Bhatt  Type of Session: Individual  Present in Session: Roby  Session Start and Stop Time: 902  Number of Minutes:  48    SERVICE MODALITY:  In-person    DSM-5 Diagnoses:  F64.0 Gender Dysphoria in Adults    Current Reported Symptoms and Status update:  Gender Dysphoria  Incongruence between birth assigned sex and experienced/expressed gender  for longer than six months, as manifested by: 1) incongruence between experienced/expressed gender and primary/secondary sex characteristics, 2) strong desire to be rid of primary/secondary sex characteristics; 3) strong desire to be gender other than birth-assigned sex.     Progress Toward Treatment Goals:   Stable/Maintenance of progress     Therapeutic Interventions/Treatment Strategies:    Area(s) of treatment focus addressed in this session included Maintenance of Progress    Relational and interpersonal approach to exploring thoughts, feelings, and emotions surrounding X and X. CLIENT reports X of X symptoms. NAME shared narrative regarding X. Remainder of time was focused on X. Writer validated, reflected, and normalized client's thoughts, feelings, and emotions throughout session. Client was open, responsive, and engaged throughout session.     Psychotherapist offered support, feedback and validation and reinforced use of skills Treatment modalities used include Solution Focused Therapy Gender Affirming Care discussed gender literacy and facilitated discussion about medical interventions  Support, Feedback, and Education    Patient Response:   Patient responded to session by accepting feedback, listening, focusing on goals, verbalizing understanding, and actively engaged  Possible barriers to participation / learning include: contextual issues, system oppression, and  political/world events worsening symptoms    Current Mental Status Exam:   Appearance:  Appropriate   Eye Contact:  Good   Attitude / Demeanor: Cooperative  Attentive  Speech      Rate / Production: Normal/ Responsive      Volume:  Normal  volume  Orientation:  All  Mood:   Normal  Affect:   Appropriate   Thought Content: Clear   Insight:   Good       Plan/Need for Future Services:  Return for therapy as needed to treat diagnosed problems.    Patient has See Epic Treatment Plan - Patient is discharging.    Referral / Collaboration:  The following referral(s) will be initiated: varying surgeons for chest mascuilnization .  Emergency Services Needed?  No    Assignment:  None    Interactive Complexity:  There are four specific communication difficulties that complicate the work of the primary psychiatric procedure.  Interactive complexity (+[id number]) may be reported when at least one of these difficulties is present.    Communication difficulties present during current the psychiatric procedure include:  None.      Signature/Title:      Anabel Johnson, ALICESW

## 2024-09-03 ASSESSMENT — ASTHMA QUESTIONNAIRES
ACT_TOTALSCORE: 13
QUESTION_4 LAST FOUR WEEKS HOW OFTEN HAVE YOU USED YOUR RESCUE INHALER OR NEBULIZER MEDICATION (SUCH AS ALBUTEROL): TWO OR THREE TIMES PER WEEK
QUESTION_3 LAST FOUR WEEKS HOW OFTEN DID YOUR ASTHMA SYMPTOMS (WHEEZING, COUGHING, SHORTNESS OF BREATH, CHEST TIGHTNESS OR PAIN) WAKE YOU UP AT NIGHT OR EARLIER THAN USUAL IN THE MORNING: TWO OR THREE NIGHTS A WEEK
QUESTION_1 LAST FOUR WEEKS HOW MUCH OF THE TIME DID YOUR ASTHMA KEEP YOU FROM GETTING AS MUCH DONE AT WORK, SCHOOL OR AT HOME: SOME OF THE TIME
QUESTION_5 LAST FOUR WEEKS HOW WOULD YOU RATE YOUR ASTHMA CONTROL: SOMEWHAT CONTROLLED
ACT_TOTALSCORE: 13
QUESTION_2 LAST FOUR WEEKS HOW OFTEN HAVE YOU HAD SHORTNESS OF BREATH: ONCE A DAY

## 2024-09-06 ENCOUNTER — OFFICE VISIT (OUTPATIENT)
Dept: FAMILY MEDICINE | Facility: CLINIC | Age: 19
End: 2024-09-06
Payer: COMMERCIAL

## 2024-09-06 VITALS
HEIGHT: 67 IN | WEIGHT: 214.6 LBS | BODY MASS INDEX: 33.68 KG/M2 | SYSTOLIC BLOOD PRESSURE: 125 MMHG | OXYGEN SATURATION: 98 % | TEMPERATURE: 98.5 F | RESPIRATION RATE: 18 BRPM | DIASTOLIC BLOOD PRESSURE: 84 MMHG | HEART RATE: 79 BPM

## 2024-09-06 DIAGNOSIS — F41.1 GENERALIZED ANXIETY DISORDER: ICD-10-CM

## 2024-09-06 DIAGNOSIS — F64.9 GENDER DYSPHORIA: Primary | ICD-10-CM

## 2024-09-06 DIAGNOSIS — F51.05 INSOMNIA DUE TO OTHER MENTAL DISORDER: ICD-10-CM

## 2024-09-06 DIAGNOSIS — Z00.00 HEALTHCARE MAINTENANCE: ICD-10-CM

## 2024-09-06 DIAGNOSIS — L70.0 ACNE VULGARIS: ICD-10-CM

## 2024-09-06 DIAGNOSIS — R09.81 NASAL CONGESTION: ICD-10-CM

## 2024-09-06 DIAGNOSIS — R06.83 SNORING: ICD-10-CM

## 2024-09-06 DIAGNOSIS — J45.40 MODERATE PERSISTENT ASTHMA WITHOUT COMPLICATION: ICD-10-CM

## 2024-09-06 DIAGNOSIS — F99 INSOMNIA DUE TO OTHER MENTAL DISORDER: ICD-10-CM

## 2024-09-06 PROCEDURE — 90651 9VHPV VACCINE 2/3 DOSE IM: CPT | Mod: SL

## 2024-09-06 PROCEDURE — 90673 RIV3 VACCINE NO PRESERV IM: CPT | Mod: SL

## 2024-09-06 PROCEDURE — 99214 OFFICE O/P EST MOD 30 MIN: CPT | Mod: 25

## 2024-09-06 PROCEDURE — 90471 IMMUNIZATION ADMIN: CPT | Mod: SL

## 2024-09-06 PROCEDURE — 90677 PCV20 VACCINE IM: CPT | Mod: SL

## 2024-09-06 PROCEDURE — 90472 IMMUNIZATION ADMIN EACH ADD: CPT | Mod: SL

## 2024-09-06 RX ORDER — CLONIDINE HYDROCHLORIDE 0.1 MG/1
0.1 TABLET ORAL 2 TIMES DAILY
Qty: 90 TABLET | Refills: 3 | Status: SHIPPED | OUTPATIENT
Start: 2024-09-06

## 2024-09-06 RX ORDER — FLUTICASONE PROPIONATE 50 MCG
1 SPRAY, SUSPENSION (ML) NASAL DAILY
Qty: 11.1 ML | Refills: 3 | Status: SHIPPED | OUTPATIENT
Start: 2024-09-06 | End: 2024-09-16

## 2024-09-06 RX ORDER — TRETINOIN 0.5 MG/G
CREAM TOPICAL AT BEDTIME
Qty: 45 G | Refills: 0 | Status: SHIPPED | OUTPATIENT
Start: 2024-09-06 | End: 2024-09-16

## 2024-09-06 NOTE — PROGRESS NOTES
Assessment & Plan     Gender affirmation   Patient initiated mHRT 3/2023 with topical testosterone. Labs 12/2023 with mild HLD, otherwise reassuring. Reports therapy going well. Bleeding resolved. Interested in voice training. Referral placed today.   - Comprehensive Gender Care Referral; Future  - Continue Androgel to 3 pumps daily   - Return 12/2023 for HRT labs     Acne vulgaris  Ongoing since testosterone therapy as above. Both comedomal and papulopustular. Will start agents below per algorithm. Reevaluate for need for antibiotic if not improving.   - START benzoyl peroxide 5 % external liquid; Apply topically daily.  - START tretinoin (RETIN-A) 0.05 % external cream; Apply topically at bedtime  - Return in 4 weeks for recheck; add clindamycin if not improving     Nasal congestion  Reports nasal congestion in the morning. Not seasonal and no improvement with allergy medications. No URI symptoms. Exam unremarkable. Will trial Flonase for improvement- may be related to asthma as below.   - START fluticasone (FLONASE) 50 MCG/ACT nasal spray; Spray 1 spray into both nostrils daily.  - Return in 4 weeks for recheck    Asthma  Low ACT score. Patient reports they were out of Albuterol for a while and feel this contributed. If not improving with Albuterol, would escalate per SMART therapy.   - Restart Albuterol PRN   - Return in 4 weeks for recheck; plan for ACT at that time     Snoring  STOP BANG 4. Referred for sleep study.   - Adult Sleep Eval & Management Referral; Future    Generalized anxiety disorder  Insomnia due to other mental disorder  Family history of bipolar disorder   Longstanding anxiety and family history of bipolar disorder. Reports improvement since initiating Clonidine. Interested in adding AM dose as previously suggested by Dr. Tirado. Not interested in therapy. No SI/HI.   - INCREASE Clonidine 0.1 mg BID  - Return in 4 weeks for recheck    Healthcare maintenance  Vaccines today.   - Pneumococcal  "20 Valent Conjugate (Prevnar 20)  - HPV9 (GARDASIL 9)  - INFLUENZA VACCINE TRIVALENT(FLUBLOK)    Subjective   Roby is a 18 year old, presenting for the following health issues:    HPI     GAHT:  - Last evaluated 12/2023. Labs with elevated lipids, otherwise reassuring.   - Initiated mHRT 3/2023 with topical testosterone.   - Interested in voice training- wants referral   - Last time still have menses and potentially interested in Mirena/Nexplanon. This has pretty much stopped- only happens     Acne:  - On topical T   - Has noticed since being on T  - Face and back   - Both comedomal and pustular   - Has tried Cerave bar soap     Congestion:  - Has been ongoing for a long time   - COVID tests are always negative   - Always in the morning   - Feels in throat/face  - Also has asthma. Uses albuterol PRN. Was out for a while   - No fevers, cough, sore throat, SOB  - Has tried allergy medications without significant difference   - Worried about YOSI due to family history   - All throughout the year   - STOP BANG 4     Mood:  - Met with Dr. Tirado previously. Could add AM dose of Clonidine if symptoms uncontrolled.   - Current regimen: Clonidine 0.1 mg bedtime and therapy   - Wants to try and increase dose         Objective    /84   Pulse 79   Temp 98.5  F (36.9  C) (Oral)   Resp 18   Ht 1.702 m (5' 7\")   Wt 97.3 kg (214 lb 9.6 oz)   SpO2 98%   BMI 33.61 kg/m    Body mass index is 33.61 kg/m .  Physical Exam  Vitals reviewed.   Constitutional:       General: He is not in acute distress.     Appearance: Normal appearance.   HENT:      Head: Normocephalic and atraumatic.   Cardiovascular:      Rate and Rhythm: Normal rate and regular rhythm.      Heart sounds: No murmur heard.  Pulmonary:      Effort: Pulmonary effort is normal. No respiratory distress.      Breath sounds: No wheezing.   Musculoskeletal:      Right lower leg: No edema.      Left lower leg: No edema.   Skin:     General: Skin is warm and dry.    "   Capillary Refill: Capillary refill takes less than 2 seconds.      Comments: Bilateral cheeks with mainly comedomal acne, patient reports intermittent pustular acne.    Neurological:      General: No focal deficit present.      Mental Status: He is alert.   Psychiatric:         Mood and Affect: Mood normal.         Behavior: Behavior normal.                    Signed Electronically by: Valentina Junior MD

## 2024-09-06 NOTE — PATIENT INSTRUCTIONS
Patient Education   Here is the plan from today's visit    FOR VOICE TRAINING:  - REFERRAL PLACED; THEY SHOULD CALL YOU    FOR ACNE:  - START BENZOYL PEROXIDE ONCE DAILY   - START RETINOID AT NIGHT   - CHECK IN ONE MONTH; SEE IF WE NEED ANTIBIOTIC     FOR CONGESTION:  - START FLONASE   - SLEEP STUDY REFERRAL   - MONITOR ASTHMA SYMPTOMS WITH REGULAR ALBUTEROL USE   - COME BACK IN ONE MONTH    FOR ANXIETY:   - INCREASE CLONIDINE TO TWICE DAILY     Please call or return to clinic if your symptoms don't go away.    Follow up plan  No follow-ups on file.    Thank you for coming to [facility] today.  Lab Testing:  **If you had lab testing today and your results are reassuring or normal they will be mailed to you or sent through [employer] within 7 days.   **If the lab tests need quick action we will call you with the results.  **If you are having labs done on a different day, please call [phone] to schedule at [facility] or [phone] for other [facility] locations. Labs do not offer walk-in appointments.  The phone number we will call with results is # [phone] (home) . If this is not the best number please call our clinic and change the number.  Medication Refills:  If you need any refills please call your pharmacy and they will contact us.   If you need to  your refill at a new pharmacy, please contact the new pharmacy directly. The new pharmacy will help you get your medications transferred faster.   Scheduling:  If you have any concerns about today's visit or wish to schedule another appointment please call our office during normal business hours [phone] (8-5:00 M-F). If you can no longer make a scheduled visit, please cancel via [employer] or call us to cancel.   If a referral was made to an [facility] specialty provider and you do not get a call from central scheduling, please refer to directions on your visit summary or call our office during normal business hours  for assistance.   If a Mammogram was ordered for you at the [facility] call [phone] to schedule or change your appointment.  If you had an XRay/CT/Ultrasound/MRI ordered the number is [phone] to schedule or change your radiology appointment.   [facility] has limited ultrasound appointments available on Wednesdays, if you would like your ultrasound at [facility], please call [phone] to schedule.   Medical Concerns:  If you have urgent medical concerns please call [phone] at any time of the day.    Valentina Junior MD

## 2024-09-14 ENCOUNTER — MYC MEDICAL ADVICE (OUTPATIENT)
Dept: FAMILY MEDICINE | Facility: CLINIC | Age: 19
End: 2024-09-14
Payer: COMMERCIAL

## 2024-09-14 DIAGNOSIS — L70.0 ACNE VULGARIS: ICD-10-CM

## 2024-09-14 DIAGNOSIS — R09.81 NASAL CONGESTION: ICD-10-CM

## 2024-09-16 RX ORDER — FLUTICASONE PROPIONATE 50 MCG
1 SPRAY, SUSPENSION (ML) NASAL DAILY
Qty: 11.1 ML | Refills: 3 | Status: SHIPPED | OUTPATIENT
Start: 2024-09-16

## 2024-09-16 RX ORDER — TRETINOIN 0.5 MG/G
CREAM TOPICAL AT BEDTIME
Qty: 45 G | Refills: 0 | Status: SHIPPED | OUTPATIENT
Start: 2024-09-16 | End: 2024-09-30

## 2024-09-16 NOTE — TELEPHONE ENCOUNTER
Patient requesting prescriptions be sent to another pharmacy. Sent per RN standing order.   Rox Ramon RN

## 2024-09-30 ENCOUNTER — MYC REFILL (OUTPATIENT)
Dept: FAMILY MEDICINE | Facility: CLINIC | Age: 19
End: 2024-09-30
Payer: COMMERCIAL

## 2024-09-30 DIAGNOSIS — L70.0 ACNE VULGARIS: ICD-10-CM

## 2024-10-02 RX ORDER — TRETINOIN 0.5 MG/G
CREAM TOPICAL AT BEDTIME
Qty: 45 G | Refills: 3 | Status: SHIPPED | OUTPATIENT
Start: 2024-10-02

## 2024-10-02 NOTE — TELEPHONE ENCOUNTER
"Request for medication refill:    benzoyl peroxide 5 % external liquid     tretinoin (RETIN-A) 0.05 % external cream    Providers if patient needs an appointment and you are willing to give a one month supply please refill for one month and  send a letter/MyChart using \".SMILLIMITEDREFILL\" .smillimited and route chart to \"P SMI \" (Giving one month refill in non controlled medications is strongly recommended before denial)    If refill has been denied, meaning absolutely no refills without visit, please complete the smart phrase \".smirxrefuse\" and route it to the \"P SMI MED REFILLS\"  pool to inform the patient and the pharmacy.    Brianne Chinchilla MA      "

## 2024-10-03 ENCOUNTER — TELEPHONE (OUTPATIENT)
Dept: FAMILY MEDICINE | Facility: CLINIC | Age: 19
End: 2024-10-03
Payer: COMMERCIAL

## 2024-10-03 ASSESSMENT — PATIENT HEALTH QUESTIONNAIRE - PHQ9
SUM OF ALL RESPONSES TO PHQ QUESTIONS 1-9: 8
SUM OF ALL RESPONSES TO PHQ QUESTIONS 1-9: 8
10. IF YOU CHECKED OFF ANY PROBLEMS, HOW DIFFICULT HAVE THESE PROBLEMS MADE IT FOR YOU TO DO YOUR WORK, TAKE CARE OF THINGS AT HOME, OR GET ALONG WITH OTHER PEOPLE: SOMEWHAT DIFFICULT

## 2024-10-03 NOTE — TELEPHONE ENCOUNTER
Patient sent in Asthma questionnaire, score was 13 so I called patient and he said that primarily he is waking up at night and thinks it is from the asthma.  Patient did not sound short of breath, is using the inhaler and is scheduled with Dr. Junior on 10/4/24    Leticia Lopez RN

## 2024-10-04 ENCOUNTER — OFFICE VISIT (OUTPATIENT)
Dept: FAMILY MEDICINE | Facility: CLINIC | Age: 19
End: 2024-10-04
Payer: COMMERCIAL

## 2024-10-04 VITALS
SYSTOLIC BLOOD PRESSURE: 134 MMHG | OXYGEN SATURATION: 96 % | DIASTOLIC BLOOD PRESSURE: 81 MMHG | TEMPERATURE: 97.7 F | HEIGHT: 67 IN | RESPIRATION RATE: 14 BRPM | HEART RATE: 78 BPM | BODY MASS INDEX: 33.15 KG/M2 | WEIGHT: 211.2 LBS

## 2024-10-04 DIAGNOSIS — F33.8 SEASONAL AFFECTIVE DISORDER (H): ICD-10-CM

## 2024-10-04 DIAGNOSIS — F41.1 GENERALIZED ANXIETY DISORDER: ICD-10-CM

## 2024-10-04 DIAGNOSIS — F64.9 GENDER DYSPHORIA: ICD-10-CM

## 2024-10-04 DIAGNOSIS — F51.05 INSOMNIA DUE TO OTHER MENTAL DISORDER: ICD-10-CM

## 2024-10-04 DIAGNOSIS — J45.40 MODERATE PERSISTENT ASTHMA WITHOUT COMPLICATION: Primary | ICD-10-CM

## 2024-10-04 DIAGNOSIS — F99 INSOMNIA DUE TO OTHER MENTAL DISORDER: ICD-10-CM

## 2024-10-04 PROCEDURE — 99214 OFFICE O/P EST MOD 30 MIN: CPT | Mod: GC

## 2024-10-04 RX ORDER — BUDESONIDE AND FORMOTEROL FUMARATE DIHYDRATE 80; 4.5 UG/1; UG/1
AEROSOL RESPIRATORY (INHALATION)
Qty: 20.4 G | Refills: 11 | Status: SHIPPED | OUTPATIENT
Start: 2024-10-04 | End: 2024-11-01

## 2024-10-04 NOTE — PROGRESS NOTES
Assessment & Plan     Moderate persistent asthma without complication  ACT score 8. Has been using Albuterol PRN, but still waking up at night with SOB. No URI symptoms. Lungs CTAB. Suspect chronically poorly controlled asthma. No evidence of acute exacerbation. Will adjust inhalers per SMART therapy. No indication for steroids.   - START budesonide-formoterol (SYMBICORT) 80-4.5 MCG/ACT Inhaler; Inhale 2 puffs once daily plus 1-2 puffs as needed. May use up to 12 puffs per day.  - STOP Albuterol   - Return in 4 weeks for recheck; evaluate for nighttime triggers     Gender affirmation   Patient initiated mHRT 3/2023 with topical testosterone. Labs 12/2023 with mild HLD, otherwise reassuring. Going well. Was not able to set up voice training- will route to CC for assistance.    - Comprehensive Gender Care Referral; Future  - Continue Androgel to 3 pumps daily   - Return 12/2023 for HRT labs     Acne vulgaris  Ongoing since testosterone therapy. Both comedomal and papulopustular. Unable to  prescriptions- will recheck next time.   - START benzoyl peroxide 5 % external liquid; Apply topically daily.  - START tretinoin (RETIN-A) 0.05 % external cream; Apply topically at bedtime  - Return in 4 weeks for recheck; add clindamycin if not improving      Nasal congestion  Improved with Flonase.   - Continue Flonase daily      Generalized anxiety disorder  Insomnia due to other mental disorder  Seasonal affective disorder  Overall stable mood. No SI/HI. Patient concerned about worsening of mood given SAD. Discussed light box and interested in trying. Prescribed as DME below- will obtain from other sources if not covered. Declined therapy referral.   - Continue Clonidine 0.1 mg BID  - Miscellaneous DME Supply Order (Use only if a more specific DME order does not already exist)  - Return in 4 weeks for recheck; plan to offer Vitamin D    Subjective   Roby is a 19 year old, presenting for the following health  "issues:  Follow Up        10/4/2024     9:37 AM   Additional Questions   Roomed by Krishna         10/4/2024    Information    services provided? No        HPI     GAHT:  - Last evaluated 12/2023. Labs with elevated lipids, otherwise reassuring.   - Initiated mHRT 3/2023 with topical testosterone.   - Interested in voice training- wants referral   - Last time still have menses and potentially interested in Mirena/Nexplanon. This has pretty much stopped- only happens      Asthma:  - ACT 13   - Wakes up at night several nights with SOB  - Uses Albuterol PRN  - Breathing well right now   - No fever, cough   - Saturating on 96%    Answers submitted by the patient for this visit:  Patient Health Questionnaire (Submitted on 10/3/2024)  If you checked off any problems, how difficult have these problems made it for you to do your work, take care of things at home, or get along with other people?: Somewhat difficult  PHQ9 TOTAL SCORE: 8      Acne:  - Last evaluated 9/6/24. Started benzoyl peroxide and retinoid   - Has not tried them yet      Congestion:  - Started Flonase last time   - Has helped congestion     Mood:  - Increased Clonidine at last visit   - Reports improvement   - Does not want to make changes             Objective    /81   Pulse 78   Temp 97.7  F (36.5  C) (Temporal)   Resp 14   Ht 1.702 m (5' 7\")   Wt 95.8 kg (211 lb 3.2 oz)   LMP  (LMP Unknown)   SpO2 96%   Breastfeeding No   BMI 33.08 kg/m    Body mass index is 33.08 kg/m .  Physical Exam  Vitals reviewed.   Constitutional:       General: He is not in acute distress.     Appearance: Normal appearance.   HENT:      Head: Normocephalic and atraumatic.   Cardiovascular:      Rate and Rhythm: Normal rate and regular rhythm.      Heart sounds: No murmur heard.  Pulmonary:      Effort: Pulmonary effort is normal. No respiratory distress.      Breath sounds: No wheezing.   Musculoskeletal:      Right lower leg: No edema.      " Left lower leg: No edema.   Skin:     General: Skin is warm and dry.      Capillary Refill: Capillary refill takes less than 2 seconds.   Neurological:      General: No focal deficit present.      Mental Status: He is alert.   Psychiatric:         Mood and Affect: Mood normal.         Behavior: Behavior normal.                    Signed Electronically by: Valentina Junior MD

## 2024-10-04 NOTE — Clinical Note
Morgan Orourke,   Referred patient to voice training at last visit. He said he called and was told they never received the referral? Placed a new one today. Please help get it scheduled if possible.   Thanks,  Aretha

## 2024-10-04 NOTE — PATIENT INSTRUCTIONS
Patient Education   Here is the plan from today's visit    FOR HRT:  - NEW REFERRAL FOR VOICE TRAINING; ROUTE TO CC   - SCHEDULE PRE OP ONE MONTH PRIOR TO SURGERY     FOR ASTHMA:  - STOP ALBUTEROL   - START SYMBICORT (DAILY AND as NEEDED)  - COME BACK IN 4 WEEKS     FOR ACNE:  -  MEDS     FOR CONGESTION:  - CONTINUE FLONASE     FOR MOOD:  - TRY LIGHT BOX       Please call or return to clinic if your symptoms don't go away.    Follow up plan  No follow-ups on file.    Thank you for coming to [facility] today.  Lab Testing:  **If you had lab testing today and your results are reassuring or normal they will be mailed to you or sent through [employer] within 7 days.   **If the lab tests need quick action we will call you with the results.  **If you are having labs done on a different day, please call [phone] to schedule at [facility] or [phone] for other [facility] Outpatient Lab locations. Labs do not offer walk-in appointments.  The phone number we will call with results is # [phone] (home) . If this is not the best number please call our clinic and change the number.  Medication Refills:  If you need any refills please call your pharmacy and they will contact us.   If you need to  your refill at a new pharmacy, please contact the new pharmacy directly. The new pharmacy will help you get your medications transferred faster.   Scheduling:  If you have any concerns about today's visit or wish to schedule another appointment please call our office during normal business hours [phone] (8-5:00 M-F). If you can no longer make a scheduled visit, please cancel via [employer] or call us to cancel.   If a referral was made to an [facility] specialty provider and you do not get a call from central scheduling, please refer to directions on your visit summary or call our office during normal business hours for assistance.   If a Mammogram was ordered for you at the Breast Center call  [phone] to schedule or change your appointment.  If you had an XRay/CT/Ultrasound/MRI ordered the number is [phone] to schedule or change your radiology appointment.   [facility] has limited ultrasound appointments available on Wednesdays, if you would like your ultrasound at [facility], please call [phone] to schedule.   Medical Concerns:  If you have urgent medical concerns please call [phone] at any time of the day.    Valentina Junior MD

## 2024-10-30 NOTE — PROGRESS NOTES
Assessment & Plan     Moderate persistent asthma without complication  Symptoms partially improved on SMART therapy. Will schedule nighttime dose to evaluate for improvement to nighttime wakening- though suspect more related to insomnia. Lungs CTAB. No evidence of acute exacerbation. No indication for steroids.   - INCREASE Symbicort to BID and PRN   - Return in 4 weeks for recheck     Generalized anxiety disorder  Insomnia due to other mental disorder  Seasonal affective disorder  Reports fluctuating mood in setting of SAD. No SI/HI. Will trial CBT-I for insomnia. Discussed light box and Vitamin D for SAD. Patient interested in trying. Declined therapy referral.   - Continue Clonidine 0.1 mg BID and melatonin   - START Vitamin D 1000 mcg daily   - Try light box   - Try CBT-I jennifer  - Return in 4 weeks for recheck     Gender affirmation   Patient initiated mHRT 3/2023 with topical testosterone. Labs 12/2023 with mild HLD, otherwise reassuring. Going well.   - Continue Androgel to 3 pumps daily   - Return 12/2023 for HRT labs      Acne vulgaris  Ongoing since testosterone therapy. Both comedomal and papulopustular. Improved on medications.   - Continue benzoyl peroxide 5 % external liquid; Apply topically daily and tretinoin (RETIN-A) 0.05 % external cream; Apply topically at bedtime  - Return in 4 weeks for recheck; add clindamycin if not improving     Subjective   Roby is a 19 year old, presenting for the following health issues:  RECHECK (Follow-up)    HPI        Asthma:  - Switched to Symbicort last visit   - Still waking up at night, but does not have to use inhaler. 2-3 times week   - Current regimen: Symbicort once daily and sometimes at night   - Breathing well right now   - No fever, cough   - Saturating on 97%     Acne:  - Last evaluated 9/6/24. Started benzoyl peroxide and retinoid   - Better with these medications      Mood:  - Increased Clonidine at last visit   - Reports improvement   - Does not want  "to make changes   - Interested in CHT  - Not interested in therapy      GAHT:  - Last evaluated 12/2023. Labs with elevated lipids, otherwise reassuring.   - Initiated mHRT 3/2023 with topical testosterone.   - Interested in voice training- wants referral   - Last time still have menses and potentially interested in Mirena/Nexplanon. This has pretty much stopped- only happens             Objective    /83 (BP Location: Left arm, Patient Position: Sitting, Cuff Size: Adult Regular)   Pulse 95   Temp 98.3  F (36.8  C) (Oral)   Resp 17   Ht 1.702 m (5' 7\")   Wt 93.5 kg (206 lb 3.2 oz)   LMP 10/31/2024 (Approximate)   SpO2 97%   BMI 32.30 kg/m    Body mass index is 32.3 kg/m .  Physical Exam  Vitals reviewed.   Constitutional:       General: He is not in acute distress.     Appearance: Normal appearance.   HENT:      Head: Normocephalic and atraumatic.   Cardiovascular:      Rate and Rhythm: Normal rate and regular rhythm.      Heart sounds: No murmur heard.  Pulmonary:      Effort: Pulmonary effort is normal. No respiratory distress.      Breath sounds: No wheezing.   Musculoskeletal:      Right lower leg: No edema.      Left lower leg: No edema.   Skin:     General: Skin is warm and dry.      Capillary Refill: Capillary refill takes less than 2 seconds.   Neurological:      General: No focal deficit present.      Mental Status: He is alert.   Psychiatric:         Mood and Affect: Mood normal.         Behavior: Behavior normal.                    Signed Electronically by: Valentina Junior MD    "

## 2024-11-01 ENCOUNTER — OFFICE VISIT (OUTPATIENT)
Dept: FAMILY MEDICINE | Facility: CLINIC | Age: 19
End: 2024-11-01
Payer: COMMERCIAL

## 2024-11-01 ENCOUNTER — QUESTIONNAIRE SERIES SUBMISSION (OUTPATIENT)
Dept: OTHER | Age: 19
End: 2024-11-01

## 2024-11-01 VITALS
DIASTOLIC BLOOD PRESSURE: 83 MMHG | HEIGHT: 67 IN | RESPIRATION RATE: 17 BRPM | TEMPERATURE: 98.3 F | HEART RATE: 95 BPM | BODY MASS INDEX: 32.36 KG/M2 | SYSTOLIC BLOOD PRESSURE: 135 MMHG | WEIGHT: 206.2 LBS | OXYGEN SATURATION: 97 %

## 2024-11-01 DIAGNOSIS — F41.1 GENERALIZED ANXIETY DISORDER: ICD-10-CM

## 2024-11-01 DIAGNOSIS — F99 INSOMNIA DUE TO OTHER MENTAL DISORDER: ICD-10-CM

## 2024-11-01 DIAGNOSIS — F33.8 SEASONAL AFFECTIVE DISORDER (H): ICD-10-CM

## 2024-11-01 DIAGNOSIS — J45.40 MODERATE PERSISTENT ASTHMA WITHOUT COMPLICATION: Primary | ICD-10-CM

## 2024-11-01 DIAGNOSIS — F51.05 INSOMNIA DUE TO OTHER MENTAL DISORDER: ICD-10-CM

## 2024-11-01 DIAGNOSIS — F64.9 GENDER DYSPHORIA: ICD-10-CM

## 2024-11-01 PROCEDURE — 99214 OFFICE O/P EST MOD 30 MIN: CPT | Mod: GC

## 2024-11-01 RX ORDER — FAMOTIDINE 20 MG
1 TABLET ORAL DAILY
Qty: 90 CAPSULE | Refills: 3 | Status: SHIPPED | OUTPATIENT
Start: 2024-11-01

## 2024-11-01 RX ORDER — BUDESONIDE AND FORMOTEROL FUMARATE DIHYDRATE 80; 4.5 UG/1; UG/1
AEROSOL RESPIRATORY (INHALATION)
Qty: 20.4 G | Refills: 11 | Status: SHIPPED | OUTPATIENT
Start: 2024-11-01

## 2024-11-01 ASSESSMENT — PAIN SCALES - GENERAL: PAINLEVEL_OUTOF10: NO PAIN (0)

## 2024-11-01 ASSESSMENT — ASTHMA QUESTIONNAIRES
ACT_TOTALSCORE: 14
ACT_TOTALSCORE: 14
QUESTION_4 LAST FOUR WEEKS HOW OFTEN HAVE YOU USED YOUR RESCUE INHALER OR NEBULIZER MEDICATION (SUCH AS ALBUTEROL): ONE OR TWO TIMES PER DAY
QUESTION_2 LAST FOUR WEEKS HOW OFTEN HAVE YOU HAD SHORTNESS OF BREATH: THREE TO SIX TIMES A WEEK
QUESTION_1 LAST FOUR WEEKS HOW MUCH OF THE TIME DID YOUR ASTHMA KEEP YOU FROM GETTING AS MUCH DONE AT WORK, SCHOOL OR AT HOME: SOME OF THE TIME
QUESTION_5 LAST FOUR WEEKS HOW WOULD YOU RATE YOUR ASTHMA CONTROL: WELL CONTROLLED
QUESTION_3 LAST FOUR WEEKS HOW OFTEN DID YOUR ASTHMA SYMPTOMS (WHEEZING, COUGHING, SHORTNESS OF BREATH, CHEST TIGHTNESS OR PAIN) WAKE YOU UP AT NIGHT OR EARLIER THAN USUAL IN THE MORNING: TWO OR THREE NIGHTS A WEEK

## 2024-11-01 NOTE — PATIENT INSTRUCTIONS
Patient Education   Here is the plan from today's visit    FOR ASTHMA:  - INCREASE SYMBICORT TO TWICE DAILY     FOR ACNE:  - CONTINUE MEDICATIONS   - THINK ABOUT CLINDAMYCIN IF NOT IMPROVING     FOR MOOD:  - TAKE ONE CLONIDINE IN MORNING AND ONE IN NIGHT  - TRY CBT-I DANYELLE    PRE OP:  - SCHEDULE PRE OP FOR 30 DAYS BEFORE SURGERY, CAN SCHEDULE IT IN DECEMBER     FOR BACK PAIN:  - SCHEDULE APPOINTMENT     Please call or return to clinic if your symptoms don't go away.    Follow up plan  No follow-ups on file.    Thank you for coming to [facility] today.  Lab Testing:  **If you had lab testing today and your results are reassuring or normal they will be mailed to you or sent through [employer] within 7 days.   **If the lab tests need quick action we will call you with the results.  **If you are having labs done on a different day, please call [phone] to schedule at [facility] or [phone] for other [facility] Outpatient Lab locations. Labs do not offer walk-in appointments.  The phone number we will call with results is # [phone] (home) . If this is not the best number please call our clinic and change the number.  Medication Refills:  If you need any refills please call your pharmacy and they will contact us.   If you need to  your refill at a new pharmacy, please contact the new pharmacy directly. The new pharmacy will help you get your medications transferred faster.   Scheduling:  If you have any concerns about today's visit or wish to schedule another appointment please call our office during normal business hours [phone] (8-5:00 M-F). If you can no longer make a scheduled visit, please cancel via [employer] or call us to cancel.   If a referral was made to an [facility] specialty provider and you do not get a call from central scheduling, please refer to directions on your visit summary or call our office during normal business hours for assistance.   If a Mammogram was ordered  for you at the Breast Center call [phone] to schedule or change your appointment.  If you had an XRay/CT/Ultrasound/MRI ordered the number is [phone] to schedule or change your radiology appointment.   [facility] has limited ultrasound appointments available on Wednesdays, if you would like your ultrasound at [facility], please call [phone] to schedule.   Medical Concerns:  If you have urgent medical concerns please call [phone] at any time of the day.    Valentina Junior MD

## 2024-11-01 NOTE — PROGRESS NOTES
Called patient to check in, says provider is adjusting meds and is hoping that that will help to reduce symptoms    Leticia Lopez RN

## 2024-11-02 ASSESSMENT — ASTHMA QUESTIONNAIRES: ACT_TOTALSCORE: 14

## 2024-11-18 ENCOUNTER — FCC EXTENDED DOCUMENTATION (OUTPATIENT)
Dept: PSYCHOLOGY | Facility: CLINIC | Age: 19
End: 2024-11-18
Payer: COMMERCIAL

## 2024-11-18 NOTE — PROGRESS NOTES
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        Disposition at Time of Last Encounter:   Comments:   Referral-outpatient therapy with provider in client's geographic location for the option of hybrid services focusing on Anxiety .       Risk Management:   Client denies a history of suicidal ideation, suicide attempts, self-injurious behavior, homicidal ideation, homicidal behavior, and and other safety concerns  Recommended that patient call 911 or go to the local ED should there be a change in any of these risk factors      Referred To:  outpatient therapy with provider in client's geographic location for the option of hybrid services focusing on Anxiety .          HERMAN Brumfield   11/18/2024

## 2024-12-09 ENCOUNTER — OFFICE VISIT (OUTPATIENT)
Dept: FAMILY MEDICINE | Facility: CLINIC | Age: 19
End: 2024-12-09
Payer: COMMERCIAL

## 2024-12-09 VITALS
OXYGEN SATURATION: 95 % | BODY MASS INDEX: 33.01 KG/M2 | WEIGHT: 210.3 LBS | RESPIRATION RATE: 16 BRPM | SYSTOLIC BLOOD PRESSURE: 145 MMHG | HEART RATE: 101 BPM | DIASTOLIC BLOOD PRESSURE: 83 MMHG | TEMPERATURE: 98 F | HEIGHT: 67 IN

## 2024-12-09 DIAGNOSIS — Z00.00 HEALTHCARE MAINTENANCE: ICD-10-CM

## 2024-12-09 DIAGNOSIS — F64.9 GENDER DYSPHORIA: Primary | ICD-10-CM

## 2024-12-09 DIAGNOSIS — G89.29 CHRONIC BILATERAL LOW BACK PAIN WITHOUT SCIATICA: ICD-10-CM

## 2024-12-09 DIAGNOSIS — M54.50 CHRONIC BILATERAL LOW BACK PAIN WITHOUT SCIATICA: ICD-10-CM

## 2024-12-09 PROCEDURE — 90651 9VHPV VACCINE 2/3 DOSE IM: CPT

## 2024-12-09 PROCEDURE — 90471 IMMUNIZATION ADMIN: CPT

## 2024-12-09 PROCEDURE — 90480 ADMN SARSCOV2 VAC 1/ONLY CMP: CPT

## 2024-12-09 PROCEDURE — 99214 OFFICE O/P EST MOD 30 MIN: CPT | Mod: 25

## 2024-12-09 PROCEDURE — 91320 SARSCV2 VAC 30MCG TRS-SUC IM: CPT

## 2024-12-09 ASSESSMENT — ASTHMA QUESTIONNAIRES
QUESTION_3 LAST FOUR WEEKS HOW OFTEN DID YOUR ASTHMA SYMPTOMS (WHEEZING, COUGHING, SHORTNESS OF BREATH, CHEST TIGHTNESS OR PAIN) WAKE YOU UP AT NIGHT OR EARLIER THAN USUAL IN THE MORNING: NOT AT ALL
ACT_TOTALSCORE: 22
QUESTION_2 LAST FOUR WEEKS HOW OFTEN HAVE YOU HAD SHORTNESS OF BREATH: ONCE OR TWICE A WEEK
QUESTION_5 LAST FOUR WEEKS HOW WOULD YOU RATE YOUR ASTHMA CONTROL: WELL CONTROLLED
QUESTION_1 LAST FOUR WEEKS HOW MUCH OF THE TIME DID YOUR ASTHMA KEEP YOU FROM GETTING AS MUCH DONE AT WORK, SCHOOL OR AT HOME: A LITTLE OF THE TIME
ACT_TOTALSCORE: 22
QUESTION_4 LAST FOUR WEEKS HOW OFTEN HAVE YOU USED YOUR RESCUE INHALER OR NEBULIZER MEDICATION (SUCH AS ALBUTEROL): NOT AT ALL

## 2024-12-09 ASSESSMENT — PAIN SCALES - GENERAL: PAINLEVEL_OUTOF10: MODERATE PAIN (5)

## 2024-12-09 NOTE — PROGRESS NOTES
Preceptor Attestation:    I discussed the patient with the resident and evaluated the patient in person. I have verified the content of the note, which accurately reflects my assessment of the patient and the plan of care.   Supervising Physician:  Shaan Castro MD.

## 2024-12-09 NOTE — LETTER
December 10, 2024    Letter Certifying Applicant s Gender Change    To Whom It May Concern:    I, Valentina Junior MD am the physician of Roby whose legal name is Susan Ramirez and  is 2005, who will henceforth be referred to as Roby. I have a doctor- patient relationship with this patient. Further, I have treated Roby in relation to their gender transition.    Roby has had appropriate clinical treatment for gender transition to the new gender of male.    I declare under penalty of perjury under the laws of the [place] that the foregoing is true and correct.    Sincerely,      Valentina Junior MD    [address] [address]  Phone: [phone]  Fax: [phone]

## 2024-12-09 NOTE — PATIENT INSTRUCTIONS
Patient Education   Here is the plan from today's visit    FOR BACK PAIN:  - PHYSICAL THERAPY   - TRY TYLENOL, IBUPROFEN, ICE HEAT, MASSAGE as NEEDED FOR PAIN  - IF NOT IMPROVING AFTER 6 WEEKS OF PT, THEN SCHEDULE WITH SPORTS MED     FOR HRT:  - WILL WRITE LETTER   - COME BACK FOR ONLY LAB ONLY VISIT     COME BACK TO DISCUSS MIGRAINES      [facility]  Lab and Imaging Center  Floor 1  [address] [place] [address]  Hours:   Monday-Friday: 6:30AM - 5:00PM  Saturday: 7:00AM - 12:00PM  Appointment needed  Phone: [phone]    [facility]  Outpatient Lab  [facility], First Floor  [address] [place] [place] [address]  Hours:   Monday - Friday 7:30a-5:30p  No appointment needed - Walk-Ins available  **For adult patients only    [facility]  First Floor Draw Station  [address] [place] [address]  Hours:   Monday-Friday 7:30a-5:30p  Appointment needed  Phone: [phone]    [facility]  12th floor of [facility] in [facility]   [address] [place] [address]    Hours: Monday-Friday 7:30am-4:30pm  Walk- Ins Available  Phone: [phone]  **For pediatric patients only           Please call or return to clinic if your symptoms don't go away.    Follow up plan  No follow-ups on file.    Thank you for coming to [facility] today.  Lab Testing:  **If you had lab testing today and your results are reassuring or normal they will be mailed to you or sent through [employer] within 7 days.   **If the lab tests need quick action we will call you with the results.  **If you are having labs done on a different day, please call [phone] to schedule at [facility] or [phone] for other [facility] Outpatient Lab locations. Labs do not offer walk-in appointments.  The phone number we will call with results is # [phone] (home) . If this is not the  best number please call our clinic and change the number.  Medication Refills:  If you need any refills please call your pharmacy and they will contact us.   If you need to  your refill at a new pharmacy, please contact the new pharmacy directly. The new pharmacy will help you get your medications transferred faster.   Scheduling:  If you have any concerns about today's visit or wish to schedule another appointment please call our office during normal business hours [phone] (8-5:00 M-F). If you can no longer make a scheduled visit, please cancel via [employer] or call us to cancel.   If a referral was made to an [facility] specialty provider and you do not get a call from central scheduling, please refer to directions on your visit summary or call our office during normal business hours for assistance.   If a Mammogram was ordered for you at the Breast Center call [phone] to schedule or change your appointment.  If you had an XRay/CT/Ultrasound/MRI ordered the number is [phone] to schedule or change your radiology appointment.   [facility] has limited ultrasound appointments available on Wednesdays, if you would like your ultrasound at [facility], please call [phone] to schedule.   Medical Concerns:  If you have urgent medical concerns please call [phone] at any time of the day.    Valentina Junior MD

## 2024-12-09 NOTE — PROGRESS NOTES
Assessment & Plan     Chronic bilateral low back pain without sciatica  Reports years of atraumatic low back pain worse with prolonged standing. No red flag back signs. Reassuring exam without tenderness, negative straight leg raise, full ROM without pain, and normal gait. Suspect MSK pain related to rounded shoulder posture. Discussed supportive treatment with PT and medications as below. No indication for imaging at this time.   - Physical Therapy  Referral; Future  - Advised Tylenol, ibuprofen, heat/ice PRN   - If not improving after 6 weeks of PT, consider OMT vs PT     Gender affirmation   Patient initiated mHRT 3/2023 with topical testosterone. Labs 12/2023 with mild HLD, otherwise reassuring. Due for labs today- but had a gap in medications due to insurance lapse. Will return for lab only visit.   - Continue Androgel to 3 pumps daily   - Return for lab only visit for HRT labs     Healthcare maintenance  Vaccines today  - COVID and HPV vaccines     Subjective   Roby is a 19 year old, presenting for the following health issues:    HPI     Back pain:  - Ongoing for years   - Intermittent, worse with standing and prolonged standing, lower back, aching pain, rest makes it better, has not tried any medications  - Pain does not go down legs, no numbness and tingling, incontinence, saddle anesthesia, fevers  - No recent falls  - Distant fall as a child and had pain after that   - Thinks it is related to bad posture    GAHT:  - Initiated mHRT 3/2023 with topical testosterone.  - Current regimen: Androgel 3 pumps daily    - Due for labs today but was off medicaitons due to insurance lapse  - Needs name change letter     Asthma:  - Current regimen: Symbicort BID and PRN   - Going well      Mood:  - Current regimen: Clonidine 0.1 mg BID, Vitamin D   - Was going to try light box   - Not interested in therapy      Insomnia:  - Was going to try CBT-I jennifer   - Also lowering melatonin helped                "  Objective    BP (!) 145/83   Pulse 101   Temp 98  F (36.7  C) (Oral)   Resp 16   Ht 1.702 m (5' 7\")   Wt 95.4 kg (210 lb 4.8 oz)   LMP 10/31/2024 (Approximate)   SpO2 95%   BMI 32.94 kg/m    Body mass index is 32.94 kg/m .  Physical Exam  Vitals reviewed.   Constitutional:       General: He is not in acute distress.     Appearance: Normal appearance.   HENT:      Head: Normocephalic and atraumatic.   Cardiovascular:      Rate and Rhythm: Normal rate and regular rhythm.      Heart sounds: No murmur heard.  Pulmonary:      Effort: Pulmonary effort is normal. No respiratory distress.      Breath sounds: No wheezing.   Musculoskeletal:      Right lower leg: No edema.      Left lower leg: No edema.      Comments: No tenderness to palpation of spinal column or paraspinal muscles. Negative straight leg raise bilaterally. Full ROM of hips and back without pain. Normal gait. Normal heel and toe walk.    Skin:     General: Skin is warm and dry.      Capillary Refill: Capillary refill takes less than 2 seconds.   Neurological:      General: No focal deficit present.      Mental Status: He is alert.   Psychiatric:         Mood and Affect: Mood normal.         Behavior: Behavior normal.                    Signed Electronically by: Valentina Junior MD    "

## 2025-01-06 NOTE — PROGRESS NOTES
PHYSICAL THERAPY EVALUATION  Type of Visit: Evaluation       Fall Risk Screen:  Fall screen completed by: PT  Have you fallen 2 or more times in the past year?: (Patient-Rptd) No  Have you fallen and had an injury in the past year?: (Patient-Rptd) No  Is patient a fall risk?: No    Subjective     Pt is a 19 year old presenting with complaints of upper back pain with B hip and B knee pain..  The symptoms started in the past year. He does note an injury moving furniture and that made thing worse.  Prior treatments: none  Pain is worse with prolonged standing, prolonged sitting, prolonged walking, bending forward washing the dishes.   Pain is better with ibuprofen  Sleep Quality: Fair. Getting a sleep study soon.   Current level of activity: not much   Goals of therapy: improve strength, prolonged positions          Presenting condition or subjective complaint: (Patient-Rptd) Back pain  Date of onset: 01/07/25 (onset within the last year)    Relevant medical history: (Patient-Rptd) Asthma; Migraines or headaches; Neck injury; Severe headaches; Sleep disorder like apnea   Dates & types of surgery: (Patient-Rptd) Pillager teeth extraction    Prior diagnostic imaging/testing results:       Prior therapy history for the same diagnosis, illness or injury: (Patient-Rptd) No      Prior Level of Function  Transfers:   Ambulation:   ADL:   IADL:     Living Environment  Social support: (Patient-Rptd) With family members   Type of home: (Patient-Rptd) Apartment/condo   Stairs to enter the home: (Patient-Rptd) Yes   Is there a railing: (Patient-Rptd) Yes   Ramp: (Patient-Rptd) No   Stairs inside the home: (Patient-Rptd) No       Help at home: (Patient-Rptd) None  Equipment owned:     Employment: (Patient-Rptd) Yes (Patient-Rptd) Admin work  Pain assessment: See objective evaluation for additional pain details     Objective     SPINE EVALUATION    PAIN: Pain Level at Rest: 3/10  Pain Level with Use: 6/10  Pain Location: thoracic  spine  Pain Quality: Aching  Pain Frequency: intermittent  Pain is Worst: activity level dependent      POSTURE: rounded shoulders, slouching in chair. Slight improvement with lumbar support roll      GAIT:   Weightbearing Status: WBAT  Assistive Device(s): None  Gait Deviations: WFL      ROM:   (Degrees) Right AROM Right PROM  Left AROM Left  PROM   Hip Flexion  WFL  WFL   Hip Extension       Hip Abduction       Hip Adduction       Hip Internal Rotation  Min tightness   Min tightness    Hip External Rotation  Min tightness   Min tightness    Knee Flexion       Knee Extension       Lumbar Side glide WFL WFL    Lumbar Flexion Min    Lumbar Extension WFL      ROM:   (Degrees) Right AROM Left AROM    Cervical Flexion     Cervical Extension     Cervical Side bend      Cervical Rotation      Cervical Protrusion     Cervical Retraction     Thoracic Flexion     Thoracic Extension Min     Thoracic Rotation Min - cramp Min     Shoulder AROM WFL    MYOTOMES: WFL lumbar and cervical     Postural Strength:  Middle Trap: 4/5 B  Lower Trap: 3+/5 B with UTcompensation   Seated Scapular retraction: UT compensation to start but overall better with cues     FUNCTIONAL TESTS:  Double Leg Squat: anterior knee excursion, small depth, heels lifted    PALPATION: mild TTP medial scapula B             Assessment & Plan   CLINICAL IMPRESSIONS  Medical Diagnosis: Chronic bilateral low back pain without sciatica    Treatment Diagnosis: thoracic spine pain, B hip and knee pain   Impression/Assessment: Patient is a 19 year old adult with LB complaints.  The following significant findings have been identified: Pain, Decreased ROM/flexibility, Decreased strength, Impaired muscle performance, Decreased activity tolerance, and Impaired posture. These impairments interfere with their ability to perform self care tasks, work tasks, recreational activities, household chores, and community mobility as compared to previous level of function.     Clinical  Decision Making (Complexity):  Clinical Presentation: Stable/Uncomplicated  Clinical Presentation Rationale: based on medical and personal factors listed in PT evaluation  Clinical Decision Making (Complexity): Low complexity    PLAN OF CARE  Treatment Interventions:  Interventions: Manual Therapy, Neuromuscular Re-education, Therapeutic Activity, Therapeutic Exercise    Long Term Goals     PT Goal 1  Goal Identifier: HEP  Goal Description: Pt will demonstrate mastery of HEP, completing at least 5x/week, without need for additional cuing in order to increase independence with self cares and self management of the condition.  Target Date: 02/18/25  PT Goal 2  Goal Identifier: Prolonged Positions  Goal Description: Pt will be able to sit >60 min or stand >60 min without increase in symptoms <2/10 NRS  Rationale: to maximize safety and independence with performance of ADLs and functional tasks;to maximize safety and independence within the home;to maximize safety and independence within the community;to maximize safety and independence with transportation;to maximize safety and independence with self cares  Target Date: 02/18/25      Frequency of Treatment: 1x/week  Duration of Treatment: 6 weeks    Recommended Referrals to Other Professionals:   Education Assessment:   Learner/Method: Patient  Education Comments: Eager to participate in therapy    Risks and benefits of evaluation/treatment have been explained.   Patient/Family/caregiver agrees with Plan of Care.     Evaluation Time:     PT Eval, Low Complexity Minutes (37200): 25       Signing Clinician: Michelle Bay, PT        [facility]                                                                                   OUTPATIENT PHYSICAL THERAPY      PLAN OF TREATMENT FOR OUTPATIENT REHABILITATION   Patient's Last Name, First Name, Susan Escudero YOB: 2005   Provider's Name   [facility]   Medical Record No.  [id number]     Onset Date: 01/07/25 (onset within the last year)  Start of Care Date: 01/07/25     Medical Diagnosis:  Chronic bilateral low back pain without sciatica      PT Treatment Diagnosis:  thoracic spine pain, B hip and knee pain Plan of Treatment  Frequency/Duration: 1x/week/ 6 weeks    Certification date from 01/07/25 to 02/18/25         See note for plan of treatment details and functional goals     Michelle Bay, PT                         I CERTIFY THE NEED FOR THESE SERVICES FURNISHED UNDER        THIS PLAN OF TREATMENT AND WHILE UNDER MY CARE     (Physician attestation of this document indicates review and certification of the therapy plan).              Referring Provider:  Shaan Castro    Initial Assessment  See Epic Evaluation- Start of Care Date: 01/07/25

## 2025-01-07 ENCOUNTER — THERAPY VISIT (OUTPATIENT)
Dept: PHYSICAL THERAPY | Facility: REHABILITATION | Age: 20
End: 2025-01-07
Payer: COMMERCIAL

## 2025-01-07 DIAGNOSIS — M54.6 BILATERAL THORACIC BACK PAIN: ICD-10-CM

## 2025-01-07 DIAGNOSIS — G89.29 CHRONIC BILATERAL LOW BACK PAIN WITHOUT SCIATICA: Primary | ICD-10-CM

## 2025-01-07 DIAGNOSIS — M54.50 CHRONIC BILATERAL LOW BACK PAIN WITHOUT SCIATICA: Primary | ICD-10-CM

## 2025-01-07 PROCEDURE — 97161 PT EVAL LOW COMPLEX 20 MIN: CPT | Mod: GP | Performed by: PHYSICAL THERAPIST

## 2025-01-07 PROCEDURE — 97110 THERAPEUTIC EXERCISES: CPT | Mod: GP | Performed by: PHYSICAL THERAPIST

## 2025-01-07 NOTE — PROGRESS NOTES
Assessment & Plan     Chronic migraine without aura without status migrainosus, not intractable  Chronic intermittent migraines. No red flag symptoms. Normal neurologic exam. Using OTC treatment with some relief. Interested in trying abortive medication. Trial of Sumatriptan as below.   - START SUMAtriptan (IMITREX) 50 MG tablet; Take 1 tablet (50 mg) by mouth at onset of headache for migraine. May repeat in 2 hours. Max 4 tablets/24 hours.  - Recommended headache diary and hydration   - Return in 6 weeks for recheck     Gender affirmation   Patient initiated mHRT 3/2023 with topical testosterone. Labs 12/2023 with mild HLD, otherwise reassuring. Due for labs today. Reports desired changes without side effects. Scheduled for top surgery 2/12/25 and needs pre-op appointment.   - GA labs pending; will call or [facility] results   - Schedule pre-op appointment     Chronic bilateral low back pain without sciatica   Evaluated 12/9/24. Currently engaged in PT. Will monitor for improvement with current treatment and reevaluate after appropriate interval.   - If not improving after 6 weeks of PT, consider OMT vs Sports Medicine     Healthcare maintenance  Vaccine today.   - MENINGOCOCCAL B 10-25Y (BEXSERO )    Subjective   Roby is a 19 year old, presenting for the following health issues:    HPI     Migraine:  - Has history of migraines.   - Last migraine was really bad because they did not have access to their Excedrin and was in a full blown migraine by the time they took anything. Last month.   - Occurs 1-5x per month. Usually on the low side. Have not increased in frequency.   - Does not have aura.   - Usually at temples, throbbing pain, lasts for hours.   - Wondering if there is something better than Excedrin because caffeine makes him jittery.   - Triggers are dehydration    GAHT:  - Initiated mHRT 3/2023 with topical testosterone.  - Current regimen: Androgel 3 pumps daily    - Able to restart and open to labs   -  "Has mastectomy in Feb, needs pre-op      Back pain:  - Started PT yesterday                   Objective    /81   Pulse 91   Temp 98.3  F (36.8  C) (Temporal)   Resp 14   Ht 1.702 m (5' 7\")   Wt 96.3 kg (212 lb 6.4 oz)   SpO2 97%   BMI 33.27 kg/m    Body mass index is 33.27 kg/m .  Physical Exam  Vitals reviewed.   Constitutional:       General: He is not in acute distress.     Appearance: Normal appearance.   HENT:      Head: Normocephalic and atraumatic.   Cardiovascular:      Rate and Rhythm: Normal rate and regular rhythm.      Heart sounds: No murmur heard.  Pulmonary:      Effort: Pulmonary effort is normal. No respiratory distress.      Breath sounds: No wheezing.   Musculoskeletal:      Right lower leg: No edema.      Left lower leg: No edema.   Skin:     General: Skin is warm and dry.      Capillary Refill: Capillary refill takes less than 2 seconds.   Neurological:      General: No focal deficit present.      Mental Status: He is alert.   Psychiatric:         Mood and Affect: Mood normal.         Behavior: Behavior normal.                    Signed Electronically by: Valentina Junior MD    " 98.7

## 2025-01-08 ENCOUNTER — OFFICE VISIT (OUTPATIENT)
Dept: FAMILY MEDICINE | Facility: CLINIC | Age: 20
End: 2025-01-08
Payer: COMMERCIAL

## 2025-01-08 VITALS
SYSTOLIC BLOOD PRESSURE: 119 MMHG | TEMPERATURE: 98.3 F | HEART RATE: 91 BPM | RESPIRATION RATE: 14 BRPM | HEIGHT: 67 IN | BODY MASS INDEX: 33.34 KG/M2 | DIASTOLIC BLOOD PRESSURE: 81 MMHG | OXYGEN SATURATION: 97 % | WEIGHT: 212.4 LBS

## 2025-01-08 DIAGNOSIS — Z00.00 HEALTHCARE MAINTENANCE: ICD-10-CM

## 2025-01-08 DIAGNOSIS — F64.9 GENDER DYSPHORIA: ICD-10-CM

## 2025-01-08 DIAGNOSIS — G43.709 CHRONIC MIGRAINE WITHOUT AURA WITHOUT STATUS MIGRAINOSUS, NOT INTRACTABLE: Primary | ICD-10-CM

## 2025-01-08 LAB
ALBUMIN SERPL BCG-MCNC: 4.6 G/DL (ref 3.5–5.2)
ALP SERPL-CCNC: 75 U/L (ref 40–150)
ALT SERPL W P-5'-P-CCNC: 31 U/L (ref 0–70)
AST SERPL W P-5'-P-CCNC: 24 U/L (ref 0–45)
BASOPHILS # BLD AUTO: 0 10E3/UL (ref 0–0.2)
BASOPHILS NFR BLD AUTO: 0 %
BILIRUB DIRECT SERPL-MCNC: <0.2 MG/DL (ref 0–0.3)
BILIRUB SERPL-MCNC: 0.2 MG/DL
CHOLEST SERPL-MCNC: 197 MG/DL
EOSINOPHIL # BLD AUTO: 0.1 10E3/UL (ref 0–0.7)
EOSINOPHIL NFR BLD AUTO: 2 %
ERYTHROCYTE [DISTWIDTH] IN BLOOD BY AUTOMATED COUNT: 12 % (ref 10–15)
FASTING STATUS PATIENT QL REPORTED: NO
FASTING STATUS PATIENT QL REPORTED: NO
GLUCOSE SERPL-MCNC: 95 MG/DL (ref 70–99)
HCT VFR BLD AUTO: 47 % (ref 35–53)
HDLC SERPL-MCNC: 41 MG/DL
HGB BLD-MCNC: 15.8 G/DL (ref 11.7–17.7)
IMM GRANULOCYTES # BLD: 0 10E3/UL
IMM GRANULOCYTES NFR BLD: 0 %
LDLC SERPL CALC-MCNC: 124 MG/DL
LYMPHOCYTES # BLD AUTO: 2.1 10E3/UL (ref 0.8–5.3)
LYMPHOCYTES NFR BLD AUTO: 24 %
MCH RBC QN AUTO: 30.2 PG (ref 26.5–33)
MCHC RBC AUTO-ENTMCNC: 33.6 G/DL (ref 31.5–36.5)
MCV RBC AUTO: 90 FL (ref 78–100)
MONOCYTES # BLD AUTO: 0.6 10E3/UL (ref 0–1.3)
MONOCYTES NFR BLD AUTO: 7 %
NEUTROPHILS # BLD AUTO: 5.9 10E3/UL (ref 1.6–8.3)
NEUTROPHILS NFR BLD AUTO: 67 %
NONHDLC SERPL-MCNC: 156 MG/DL
PLATELET # BLD AUTO: 361 10E3/UL (ref 150–450)
PROT SERPL-MCNC: 7.7 G/DL (ref 6.4–8.3)
RBC # BLD AUTO: 5.23 10E6/UL (ref 3.8–5.9)
TRIGL SERPL-MCNC: 161 MG/DL
WBC # BLD AUTO: 8.7 10E3/UL (ref 4–11)

## 2025-01-08 RX ORDER — SUMATRIPTAN 50 MG/1
50 TABLET, FILM COATED ORAL
Qty: 30 TABLET | Refills: 1 | Status: SHIPPED | OUTPATIENT
Start: 2025-01-08

## 2025-01-08 NOTE — PATIENT INSTRUCTIONS
Patient Education   Here is the plan from today's visit    FOR MIGRAINE:  - START SUMATRIPTAN as NEEDED  - KEEP HEADACHE DIARY  - COME BACK FOR RECHECK     FOR GENDER CARE:  - LABS TODAY  - SCHEDULE PRE OP    FOR BACK PAIN:  - CONTINUE THERAPY; RECHECK NEXT TIME       Please call or return to clinic if your symptoms don't go away.    Follow up plan  No follow-ups on file.    Thank you for coming to [facility] today.  Lab Testing:  **If you had lab testing today and your results are reassuring or normal they will be mailed to you or sent through [facility] within 7 days.   **If the lab tests need quick action we will call you with the results.  **If you are having labs done on a different day, please call [phone] to schedule at [facility] or [phone] for other [facility] locations. Labs do not offer walk-in appointments.  The phone number we will call with results is # [phone] (home) . If this is not the best number please call our clinic and change the number.  Medication Refills:  If you need any refills please call your pharmacy and they will contact us.   If you need to  your refill at a new pharmacy, please contact the new pharmacy directly. The new pharmacy will help you get your medications transferred faster.   Scheduling:  If you have any concerns about today's visit or wish to schedule another appointment please call our office during normal business hours [phone] (8-5:00 M-F). If you can no longer make a scheduled visit, please cancel via [facility] or call us to cancel.   If a referral was made to an [facility] specialty provider and you do not get a call from central scheduling, please refer to directions on your visit summary or call our office during normal business hours for assistance.   If a Mammogram was ordered for you at the [facility] call [phone] to schedule or change your appointment.  If you had an XRay/CT/Ultrasound/MRI ordered  the number is [phone] to schedule or change your radiology appointment.   [facility] has limited ultrasound appointments available on Wednesdays, if you would like your ultrasound at [facility], please call [phone] to schedule.   Medical Concerns:  If you have urgent medical concerns please call [phone] at any time of the day.    Valentina Junior MD

## 2025-01-15 ENCOUNTER — THERAPY VISIT (OUTPATIENT)
Dept: PHYSICAL THERAPY | Facility: REHABILITATION | Age: 20
End: 2025-01-15
Attending: FAMILY MEDICINE
Payer: COMMERCIAL

## 2025-01-15 ENCOUNTER — OFFICE VISIT (OUTPATIENT)
Dept: SLEEP MEDICINE | Facility: CLINIC | Age: 20
End: 2025-01-15
Attending: FAMILY MEDICINE
Payer: COMMERCIAL

## 2025-01-15 VITALS
DIASTOLIC BLOOD PRESSURE: 78 MMHG | BODY MASS INDEX: 33.01 KG/M2 | HEART RATE: 84 BPM | OXYGEN SATURATION: 94 % | WEIGHT: 210.3 LBS | HEIGHT: 67 IN | SYSTOLIC BLOOD PRESSURE: 117 MMHG

## 2025-01-15 DIAGNOSIS — F41.1 GAD (GENERALIZED ANXIETY DISORDER): ICD-10-CM

## 2025-01-15 DIAGNOSIS — F64.9 GENDER DYSPHORIA: ICD-10-CM

## 2025-01-15 DIAGNOSIS — F64.9 GENDER DYSPHORIA: Primary | ICD-10-CM

## 2025-01-15 DIAGNOSIS — R06.83 SNORING: ICD-10-CM

## 2025-01-15 DIAGNOSIS — R29.818 SUSPECTED SLEEP APNEA: Primary | ICD-10-CM

## 2025-01-15 DIAGNOSIS — G89.29 CHRONIC BILATERAL LOW BACK PAIN WITHOUT SCIATICA: ICD-10-CM

## 2025-01-15 DIAGNOSIS — M54.6 BILATERAL THORACIC BACK PAIN, UNSPECIFIED CHRONICITY: Primary | ICD-10-CM

## 2025-01-15 DIAGNOSIS — M54.50 CHRONIC BILATERAL LOW BACK PAIN WITHOUT SCIATICA: ICD-10-CM

## 2025-01-15 PROCEDURE — 97110 THERAPEUTIC EXERCISES: CPT | Mod: GP | Performed by: PHYSICAL THERAPIST

## 2025-01-15 PROCEDURE — G2211 COMPLEX E/M VISIT ADD ON: HCPCS | Performed by: NURSE PRACTITIONER

## 2025-01-15 PROCEDURE — 99215 OFFICE O/P EST HI 40 MIN: CPT | Performed by: NURSE PRACTITIONER

## 2025-01-15 RX ORDER — ZOLPIDEM TARTRATE 5 MG/1
TABLET ORAL
Qty: 1 TABLET | Refills: 0 | Status: SHIPPED | OUTPATIENT
Start: 2025-01-15

## 2025-01-15 RX ORDER — IBUPROFEN/PSEUDOEPHEDRINE HCL 200MG-30MG
3 TABLET ORAL AT BEDTIME
COMMUNITY
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
## 2025-01-15 NOTE — PATIENT INSTRUCTIONS
"          MY TREATMENT INFORMATION FOR SLEEP APNEA-  Susan Ramirez    DOCTOR : HUMA Alvarado CNP    Am I having a sleep study at a sleep center?  --->Due to normal delays, you will be contacted within 2-4 weeks to schedule    Am I having a home sleep study?  --->Watch the video for the device you are using:    -/drop off device-   https://www.[employer].com/watch?v=yGGFBdELGhk    -Disposable device sent out require phone/computer application-   https://www.[employer].com/watch?v=BCce_vbiwxE      Frequently asked questions:  1. What is Obstructive Sleep Apnea (YOSI)? YOSI is the most common type of sleep apnea. Apnea means, \"without breath.\"  Apnea is most often caused by narrowing or collapse of the upper airway as muscles relax during sleep.   Almost everyone has occasional apneas. Most people with sleep apnea have had brief interruptions at night frequently for many years.  The severity of sleep apnea is related to how frequent and severe the events are.   2. What are the consequences of YOSI? Symptoms include: feeling sleepy during the day, snoring loudly, gasping or stopping of breathing, trouble sleeping, and occasionally morning headaches or heartburn at night.  Sleepiness can be serious and even increase the risk of falling asleep while driving. Other health consequences may include development of high blood pressure and other cardiovascular disease in persons who are susceptible. Untreated YOSI  can contribute to heart disease, stroke and diabetes.   3. What are the treatment options? In most situations, sleep apnea is a lifelong disease that must be managed with daily therapy. Medications are not effective for sleep apnea and surgery is generally not considered until other therapies have been tried. Your treatment is your choice . Continuous Positive Airway (CPAP) works right away and is the therapy that is effective in nearly everyone. An oral device to hold your jaw forward is usually the next " most reliable option. Other options include postioning devices (to keep you off your back), weight loss, and surgery including a tongue pacing device. There is more detail about some of these options below.  4. Are my sleep studies covered by insurance? Although we will request verification of coverage, we advise you also check in advance of the study to ensure there is coverage.    Important tips for those choosing CPAP and similar devices  REMEMBER-IF YOU RECEIVE A CALL FROM  [phone]-->IT IS TO SETUP A DEVICE  For new devices, sign up for device JENNIFER to monitor your device for your followup visits  We encourage you to utilize the [employer] jennifer or website ( https://[employer]/ ) to monitor your therapy progress and share the data with your healthcare team when you discuss your sleep apnea.                                                    Know your equipment:  CPAP is continuous positive airway pressure that prevents obstructive sleep apnea by keeping the throat from collapsing while you are sleeping. In most cases, the device is  smart  and can slowly self-adjusts if your throat collapses and keeps a record every day of how well you are treated-this information is available to you and your care team.  BPAP is bilevel positive airway pressure that keeps your throat open and also assists each breath with a pressure boost to maintain adequate breathing.  Special kinds of BPAP are used in patients who have inadequate breathing from lung or heart disease. In most cases, the device is  smart  and can slowly self-adjusts to assist breathing. Like CPAP, the device keeps a record of how well you are treated.  Your mask is your connection to the device. You get to choose what feels most comfortable and the staff will help to make sure if fits. Here: are some examples of the different masks that are available: Magnetic mask aids may assist with use but there are safety issues that should be addressed when  considering with magnets* ( see end of discussion).       Key points to remember on your journey with sleep apnea:  Sleep study.  PAP devices often need to be adjusted during a sleep study to show that they are effective and adjusted right.  Good tips to remember: Try wearing just the mask during a quiet time during the day so your body adapts to wearing it. A humidifier is recommended for comfort in most cases to prevent drying of your nose and throat. Allergy medication from your provider may help you if you are having nasal congestion.  Getting settled-in. It takes more than one night for most of us to get used to wearing a mask. Try wearing just the mask during a quiet time during the day so your body adapts to wearing it. A humidifier is recommended for comfort in most cases. Our team will work with you carefully on the first day and will be in contact within 4 days and again at 2 and 4 weeks for advice and remote device adjustments. Your therapy is evaluated by the device each day.   Use it every night. The more you are able to sleep naturally for 7-8 hours, the more likely you will have good sleep and to prevent health risks or symptoms from sleep apnea. Even if you use it 4 hours it helps. Occasionally all of us are unable to use a medical therapy, in sleep apnea, it is not dangerous to miss one night.   Communicate. Call our skilled team on the number provided on the first day if your visit for problems that make it difficult to wear the device. Over 2 out of 3 patients can learn to wear the device long-term with help from our team. Remember to call our team or your sleep providers if you are unable to wear the device as we may have other solutions for those who cannot adapt to mask CPAP therapy. It is recommended that you sleep your sleep provider within the first 3 months and yearly after that if you are not having problems.   Use it for your health. We encourage use of CPAP masks during daytime quiet  periods to allow your face and brain to adapt to the sensation of CPAP so that it will be a more natural sensation to awaken to at night or during naps. This can be very useful during the first few weeks or months of adapting to CPAP though it does not help medically to wear CPAP during wakefulness and  should not be used as a strategy just to meet guidelines.  Take care of your equipment. Make sure you clean your mask and tubing using directions every day and that your filter and mask are replaced as recommended or if they are not working.     *Masks with magnets:  Updated Contraindications  Masks with magnetic components are contraindicated for use by patients where they, or anyone in close physical contact while using the mask, have the following:   Active medical implants that interact with magnets (i.e., pacemakers, implantable cardioverter defibrillators (ICD), neurostimulators, cerebrospinal fluid (CSF) shunts, insulin/infusion pumps)   Metallic implants/objects containing ferromagnetic material (i.e., aneurysm clips/flow disruption devices, embolic coils, stents, valves, electrodes, implants to restore hearing or balance with implanted magnets, ocular implants, metallic splinters in the eye)  Updated Warning  Keep the mask magnets at a safe distance of at least 6 inches (150 mm) away from implants or medical devices that may be adversely affected by magnetic interference. This warning applies to you or anyone in close physical contact with your mask. The magnets are in the frame and lower headgear clips, with a magnetic field strength of up to 400mT. When worn, they connect to secure the mask but may inadvertently detach while asleep.  Implants/medical devices, including those listed within contraindications, may be adversely affected if they change function under external magnetic fields or contain ferromagnetic materials that attract/repel to magnetic fields (some metallic implants, e.g., contact lenses  with metal, dental implants, metallic cranial plates, screws, luis hole covers, and bone substitute devices). Consult your physician and  of your implant / other medical device for information on the potential adverse effects of magnetic fields.    BESIDES CPAP, WHAT OTHER THERAPIES ARE THERE?    Positioning Device  Positioning devices are generally used when sleep apnea is mild and only occurs on your back.This example shows a pillow that straps around the waist. It may be appropriate for those whose sleep study shows milder sleep apnea that occurs primarily when lying flat on one's back. Preliminary studies have shown benefit but effectiveness at home may need to be verified by a home sleep test. These devices are generally not covered by medical insurance.  Examples of devices that maintain sleeping on the back to prevent snoring and mild sleep apnea.    Belt type body positioner  http://[employer]/    Electronic reminder  http://nightshifttherapy.[employer]/            Oral Appliance  What is oral appliance therapy?  An oral appliance device fits on your teeth at night like a retainer used after having braces. The device is made by a specialized dentist and requires several visits over 1-2 months before a manufactured device is made to fit your teeth and is adjusted to prevent your sleep apnea. Once an oral device is working properly, snoring should be improved. A home sleep test may be recommended at that time if to determine whether the sleep apnea is adequately treated.       Some things to remember:  -Oral devices are often, but not always, covered by your medical insurance. Be sure to check with your insurance provider.   -If you are referred for oral therapy, you will be given a list of specialized dentists to consider or you may choose to visit the Web site of the American Academy of Dental Sleep Medicine  -Oral devices are less likely to work if you have severe sleep apnea or are extremely  overweight.     More detailed information  An oral appliance is a small acrylic device that fits over the upper and lower teeth  (similar to a retainer or a mouth guard). This device slightly moves jaw forward, which moves the base of the tongue forward, opens the airway, improves breathing for effective treat snoring and obstructive sleep apnea in perhaps 7 out of 10 people .  The best working devices are custom-made by a dental device  after a mold is made of the teeth 1, 2, 3.  When is an oral appliance indicated?  Oral appliance therapy is recommended as a first-line treatment for patients with primary snoring, mild sleep apnea, and for patients with moderate sleep apnea who prefer appliance therapy to use of CPAP4, 5. Severity of sleep apnea is determined by sleep testing and is based on the number of respiratory events per hour of sleep.   How successful is oral appliance therapy?  The success rate of oral appliance therapy in patients with mild sleep apnea is 75-80% while in patients with moderate sleep apnea it is 50-70%. The chance of success in patients with severe sleep apnea is 40-50%. The research also shows that oral appliances have a beneficial effect on the cardiovascular health of YOSI patients at the same magnitude as CPAP therapy7.  Oral appliances should be a second-line treatment in cases of severe sleep apnea, but if not completely successful then a combination therapy utilizing CPAP plus oral appliance therapy may be effective. Oral appliances tend to be effective in a broad range of patients although studies show that the patients who have the highest success are females, younger patients, those with milder disease, and less severe obesity. 3, 6.   Finding a dentist that practices dental sleep medicine  Specific training is available through the American Academy of Dental Sleep Medicine for dentists interested in working in the field of sleep. To find a dentist who is educated in  the field of sleep and the use of oral appliances, near you, visit the Web site of the American Academy of Dental Sleep Medicine.    References  1. Theresa, et al. Objectively measured vs self-reported compliance during oral appliance therapy for sleep-disordered breathing. Chest 2013; 144(5): 0101-1310.  2. Vinicio et al. Objective measurement of compliance during oral appliance therapy for sleep-disordered breathing. Thorax 2013; 68(1): 91-96.  3. Batsheva et al. Mandibular advancement devices in 620 men and women with YOSI and snoring: tolerability and predictors of treatment success. Chest 2004; 125: 2933-1436.  4. Abdirahman, et al. Oral appliances for snoring and YOSI: a review. Sleep 2006; 29: 244-262.  5. Jennifer et al. Oral appliance treatment for YOSI: an update. J Clin Sleep Med 2014; 10(2): 215-227.  6. Megan et al. Predictors of OSAH treatment outcome. J Dent Res 2007; 86: 3945-5827.      Weight Loss:   Your Body mass index is 32.93 kg/m .    Being overweight does not necessarily mean you will have health consequences.  Those who have BMI over 35 or over 27 with existing medical conditions carries greater risk.   Weight loss decreases severity of sleep apnea in most people with obesity. For those with mild obesity who have developed snoring with weight gain, even 15-30 pound weight loss can improve and occasionally milder eliminate sleep apnea.  Structured and life-long dietary and health habits are necessary to lose weight and keep healthier weight levels.     The Comprehensive Weight loss program offers all aspects of weight loss strategies including two Non-Surgical Weight Loss Programs: Medical Weight Management and our 24 Week Healthy Lifestyle Program:    Medical Weight Management: You will meet with a Medical Weight Management Provider, as well as a Registered Dietician. The program may include medication therapy, dietary education, recommended exercise and physical therapy programs,  monthly support group meetings, and possible psychological counseling. Follow up visits with the provider or dietician are scheduled based on your progress and needs.    24 Week Healthy Lifestyle Program: This unique program is designed to give you the support of weekly appointments and activities thru a 24-week period. It may include all of the components of the basic program (above), with the addition of 11 individual Health  Visits, 24-week access to the [employer] website for over 700 online classes, and monthly support group meetings. This program has an out-of-pocket expense of $499 to cover the items that can not be billed to insurance (health coaches and [employer] [facility] access), and is non-refundable/non-transferable (you may be able to use a Health Savings Account; ask your HSA provider). There may be an optional meal replacement plan prescribed as well.   Surgical management achieves meaningful long-term weight loss and improvement in health risks in most patients with more severe obesity.      Sleep Apnea Surgery:    Surgery for obstructive sleep apnea is considered generally only when other therapies fail to work. Surgery may be discussed with you if you are having a difficult time tolerating CPAP and or when there is an abnormal structure that requires surgical correction.  Nose and throat surgeries often enlarge the airway to prevent collapse.  Most of these surgeries create pain for 1-2 weeks and up to half of the most common surgeries are not effective throughout life.  You should carefully discuss the benefits and drawbacks to surgery with your sleep provider and surgeon to determine if it is the best solution for you.   More information  Surgery for YOSI is directed at areas that are responsible for narrowing or complete obstruction of the airway during sleep.  There are a wide range of procedures available to enlarge and/or stabilize the airway to prevent blockage of breathing in the three major  areas where it can occur: the palate, tongue, and nasal regions.  Successful surgical treatment depends on the accurate identification of the factors responsible for obstructive sleep apnea in each person.  A personalized approach is required because there is no single treatment that works well for everyone.  Because of anatomic variation, consultation with an examination by a sleep surgeon is a critical first step in determining what surgical options are best for each patient.  In some cases, examination during sedation may be recommended in order to guide the selection of procedures.  Patients will be counseled about risks and benefits as well as the typical recovery course after surgery. Surgery is typically not a cure for a person s YOSI.  However, surgery will often significantly improve one s YOSI severity (termed  success rate ).  Even in the absence of a cure, surgery will decrease the cardiovascular risk associated with OSA7; improve overall quality of life8 (sleepiness, functionality, sleep quality, etc).      Palate Procedures:  Patients with YOSI often have narrowing of their airway in the region of their tonsils and uvula.  The goals of palate procedures are to widen the airway in this region as well as to help the tissues resist collapse.  Modern palate procedure techniques focus on tissue conservation and soft tissue rearrangement, rather than tissue removal.  Often the uvula is preserved in this procedure. Residual sleep apnea is common in patient after pharyngoplasty with an average reduction in sleep apnea events of 33%2.      Tongue Procedures:  ExamWhile patients are awake, the muscles that surround the throat are active and keep this region open for breathing. These muscles relax during sleep, allowing the tongue and other structures to collapse and block breathing.  There are several different tongue procedures available.  Selection of a tongue base procedure depends on characteristics seen on  physical exam.  Generally, procedures are aimed at removing bulky tissues in this area or preventing the back of the tongue from falling back during sleep.  Success rates for tongue surgery range from 50-62%3.    Hypoglossal Nerve Stimulation:  Hypoglossal nerve stimulation has recently received approval from the [place] Food and Drug Administration for the treatment of obstructive sleep apnea.  This is based on research showing that the system was safe and effective in treating sleep apnea6.  Results showed that the median AHI score decreased 68%, from 29.3 to 9.0. This therapy uses an implant system that senses breathing patterns and delivers mild stimulation to airway muscles, which keeps the airway open during sleep.  The system consists of three fully implanted components: a small generator (similar in size to a pacemaker), a breathing sensor, and a stimulation lead.  Using a small handheld remote, a patient turns the therapy on before bed and off upon awakening.    Candidates for this device must be greater than 18 years of age, have moderate to severe obstructive sleep apnea with less than 25% central events  (AHI between 15-65), BMI less than 35, have tried CPAP/oral appliance for at least 8 weeks without success, and have appropriate upper airway anatomy (determined by a sleep endoscopy performed by Dr. Abdulkadir Siddiqi or Dr. Esau Becerril).    Nasal Procedures:  Nasal obstruction can interfere with nasal breathing during the day and night.  Studies have shown that relief of nasal obstruction can improve the ability of some patients to tolerate positive airway pressure therapy for obstructive sleep apnea1.  Treatment options include medications such as nasal saline, topical corticosteroid and antihistamine sprays, and oral medications such as antihistamines or decongestants. Non-surgical treatments can include external nasal dilators for selected patients. If these are not successful by themselves,  surgery can improve the nasal airway either alone or in combination with these other options.        Combination Procedures:  Combination of surgical procedures and other treatments may be recommended, particularly if patients have more than one area of narrowing or persistent positional disease.  The success rate of combination surgery ranges from 66-80%2,3.    References  Crystal ZHU. The Role of the Nose in Snoring and Obstructive Sleep Apnoea: An Update.  Eur Arch Otorhinolaryngol. 2011; 268: 1365-73.   Kem SM; Louis JA; Aimee JR; Pallanch JF; Juanita MB; Nomi SG; Darrick CARRENO. Surgical modifications of the upper airway for obstructive sleep apnea in adults: a systematic review and meta-analysis. SLEEP 2010;33(10):4843-0592. Fanny KLEIN. Hypopharyngeal surgery in obstructive sleep apnea: an evidence-based medicine review.  Arch Otolaryngol Head Neck Surg. 2006 Feb;132(2):206-13.  Renzo YH1, Aiden Y, Valdo ALIYAH. The efficacy of anatomically based multilevel surgery for obstructive sleep apnea. Otolaryngol Head Neck Surg. 2003 Oct;129(4):327-35.  Fanny KLEIN, Goldberg A. Hypopharyngeal Surgery in Obstructive Sleep Apnea: An Evidence-Based Medicine Review. Arch Otolaryngol Head Neck Surg. 2006 Feb;132(2):206-13.  John DORSEY et al. Upper-Airway Stimulation for Obstructive Sleep Apnea.  N Engl J Med. 2014 Jan 9;370(2):139-49.  Ashley Y et al. Increased Incidence of Cardiovascular Disease in Middle-aged Men with Obstructive Sleep Apnea. Am J Respir Crit Care Med; 2002 166: 159-165  Hayes EM et al. Studying Life Effects and Effectiveness of Palatopharyngoplasty (SLEEP) study: Subjective Outcomes of Isolated Uvulopalatopharyngoplasty. Otolaryngol Head Neck Surg. 2011; 144: 623-631.        WHAT IF I ONLY HAVE SNORING?    Mandibular advancement devices, lateral sleep positioning, long-term weight loss and treatment of nasal allergies have been shown to improve snoring.  Exercising tongue muscles with a game  (https://iovox.[employer]/us/jennifer/soundly-reduce-snoring/zh6145962827) or stimulating the tongue during the day with a device (https://doi.org/10.3390/dcv00949722) have improved snoring in some individuals.  https://www.[employer]/  https://www.sleepfoundation.org/best-anti-snoring-mouthpieces-and-mouthguards    Remember to Drive Safe... Drive Alive     Sleep health profoundly affects your health, mood, and your safety.  Thirty three percent of the population (one in three of us) is not getting enough sleep and many have a sleep disorder. Not getting enough sleep or having an untreated / undertreated sleep condition may make us sleepy without even knowing it. In fact, our driving could be dramatically impaired due to our sleep health. As your provider, here are some things I would like you to know about driving:     Here are some warning signs for impairment and dangerous drowsy driving:              -Having been awake more than 16 hours               -Looking tired               -Eyelid drooping              -Head nodding (it could be too late at this point)              -Driving for more than 30 minutes     Some things you could do to make the driving safer if you are experiencing some drowsiness:              -Stop driving and rest              -Call for transportation              -Make sure your sleep disorder is adequately treated     Some things that have been shown NOT to work when experiencing drowsiness while driving:              -Turning on the radio              -Opening windows              -Eating any  distracting  /  entertaining  foods (e.g., sunflower seeds, candy, or any other)              -Talking on the phone      Your decision may not only impact your life, but also the life of others. Please, remember to drive safe for yourself and all of us.

## 2025-01-22 ENCOUNTER — OFFICE VISIT (OUTPATIENT)
Dept: FAMILY MEDICINE | Facility: CLINIC | Age: 20
End: 2025-01-22
Payer: COMMERCIAL

## 2025-01-22 VITALS
TEMPERATURE: 98.1 F | OXYGEN SATURATION: 97 % | RESPIRATION RATE: 20 BRPM | HEIGHT: 67 IN | DIASTOLIC BLOOD PRESSURE: 84 MMHG | BODY MASS INDEX: 32.94 KG/M2 | SYSTOLIC BLOOD PRESSURE: 128 MMHG | HEART RATE: 100 BPM

## 2025-01-22 DIAGNOSIS — Z91.040 LATEX ALLERGY: ICD-10-CM

## 2025-01-22 DIAGNOSIS — J45.40 MODERATE PERSISTENT ASTHMA WITHOUT COMPLICATION: ICD-10-CM

## 2025-01-22 DIAGNOSIS — G47.01 INSOMNIA DUE TO MEDICAL CONDITION: ICD-10-CM

## 2025-01-22 DIAGNOSIS — Z01.818 PREOP GENERAL PHYSICAL EXAM: Primary | ICD-10-CM

## 2025-01-22 PROBLEM — G47.33 OSA (OBSTRUCTIVE SLEEP APNEA): Status: RESOLVED | Noted: 2025-01-22 | Resolved: 2025-01-22

## 2025-01-22 PROBLEM — G47.33 OSA (OBSTRUCTIVE SLEEP APNEA): Status: ACTIVE | Noted: 2025-01-22

## 2025-01-22 RX ORDER — MONTELUKAST SODIUM 10 MG/1
10 TABLET ORAL AT BEDTIME
Qty: 90 TABLET | Refills: 0 | Status: SHIPPED | OUTPATIENT
Start: 2025-01-22

## 2025-01-22 ASSESSMENT — ASTHMA QUESTIONNAIRES: ACT_TOTALSCORE: 19

## 2025-01-22 NOTE — PROGRESS NOTES
Preoperative Evaluation  [address]  [address] [place] [id number]  Phone: [phone]  Fax: [phone]  Primary Provider: Valentina Junior MD  Pre-op Performing Provider: Neyda Espinosa DO  Jan 22, 2025 1/21/2025   Surgical Information   What procedure is being done? Gender affirming mastectomy   Facility or Hospital where procedure/surgery will be performed: [facility]   Who is doing the procedure / surgery? Dr. Leelee Mcginnis   Date of surgery / procedure: Feb 12th 2025   Time of surgery / procedure: 1:00pm   Where do you plan to recover after surgery? at home with family     Fax number for surgical facility: [phone]    Assessment & Plan     The proposed surgical procedure is considered LOW risk.    Preop general physical exam  Physical examination normal besides Mallampati score IV. Undergoing assessment for YOSI by sleep medicine at this time. Optimizing asthma management. See plan below.     Latex allergy  Mild. Associated with hives. Updated allergy list.     Moderate persistent asthma without complication  ACT score 19 suggesting his Symbicort is not enough. Notes that cold air exacerbates his symptoms and reports that there are many allergens in his house. Will add Singulair since low dose ICS-formoterol therapy has been insufficient. Next option would be to increase the dose of Symbicort.  - montelukast (SINGULAIR) 10 MG tablet; Take 1 tablet (10 mg) by mouth at bedtime.    Insomnia due to medical condition  Insomnia severity index score at sleep medicine visit on 1/15 was 18, suggesting moderate clinical insomnia. STOP-BANG SCORE at visit suggest high risk for YOSI. Still needs to undergo sleep study to confirm.    Possible Sleep Apnea: Currently being managed by sleep medicine     Risks and Recommendations  The patient has the following additional risks and recommendations for perioperative  complications:    Obstructive Sleep Apnea, not yet officially diagnosed but likely  Currently being managed by sleep medicine. Still needs to undergo a sleep study. Mallampati Class IV.    Latex Allergy:  Reportedly mild. Associated with hives. No history of anaphylaxis. Updated allergy list.     Antiplatelet or Anticoagulation Medication Instructions   - Patient is on no antiplatelet or anticoagulation medications.    Additional Medication Instructions  Take all scheduled medications on the day of surgery EXCEPT for modifications listed below:  Hold sumatriptan on day of surgery.  Clonidine may be continued without modification, per guidelines, given HR > 60 and SBP > 100 at this encounter.   Per current guidelines, testosterone may be continued without modification.    Recommendation  Approval given to proceed with proposed procedure, without further diagnostic evaluation.    Subjective   Roby is a 19 year old, presenting for the following:  Pre-Op Exam        1/22/2025    10:00 AM   Additional Questions   Roomed by ua   Accompanied by Self         1/22/2025   Declines Weight   Did patient decline having their weight taken? Yes         1/22/2025    10:00 AM   Patient Reported Additional Medications   Patient reports taking the following new medications n/a         1/22/2025    Information    services provided? No     HPI related to upcoming procedure: Feeling excited. Only surgery prior to this is wisdom teeth removal. No known family history of lung or heart disease. Personal history of moderate persistent asthma. Mild latex allergy that results in hives. No history of anaphylaxis. Has social support at home to assist with recovery.         1/21/2025   Pre-Op Questionnaire   Have you ever had a heart attack or stroke? No   Have you ever had surgery on your heart or blood vessels, such as a stent placement, a coronary artery bypass, or surgery on an artery in your head, neck, heart, or  legs? No   Do you have chest pain with activity? No   Do you have a history of heart failure? No   Do you currently have a cold, bronchitis or symptoms of other infection? No   Do you have a cough, shortness of breath, or wheezing? No   Do you or anyone in your family have previous history of blood clots? (!) UNKNOWN    Do you or does anyone in your family have a serious bleeding problem such as prolonged bleeding following surgeries or cuts? No   Have you ever had problems with anemia or been told to take iron pills? No   Have you had any abnormal blood loss such as black, tarry or bloody stools, or abnormal vaginal bleeding? No   Have you ever had a blood transfusion? No   Are you willing to have a blood transfusion if it is medically needed before, during, or after your surgery? Yes   Have you or any of your relatives ever had problems with anesthesia? No   Do you have sleep apnea, excessive snoring or daytime drowsiness? (!) YES   Do you have any artifical heart valves or other implanted medical devices like a pacemaker, defibrillator, or continuous glucose monitor? No   Do you have artificial joints? No   Are you allergic to latex? (!) YES     Health Care Directive  Patient does not have a Health Care Directive: Discussed advance care planning with patient; information given to patient to review.    Preoperative Review of    reviewed - controlled substances reflected in medication list.    Status of Chronic Conditions:  See problem list for active medical problems.  Problems all longstanding and stable, except as noted/documented.  See ROS for pertinent symptoms related to these conditions.    Patient Active Problem List    Diagnosis Date Noted    Bilateral thoracic back pain 01/07/2025     Priority: Medium    Moderate persistent asthma without complication 12/06/2023     Priority: Medium    Migraine 03/21/2023     Priority: Medium    LEMUEL (generalized anxiety disorder) 02/28/2023     Priority: Medium     Gender dysphoria 02/28/2023     Priority: Medium      Past Medical History:   Diagnosis Date    Asthma     Generalized anxiety disorder     Migraine      No past surgical history on file.  Current Outpatient Medications   Medication Sig Dispense Refill    benzoyl peroxide 5 % external liquid Apply topically daily. 148 mL 3    budesonide-formoterol (SYMBICORT) 80-4.5 MCG/ACT Inhaler Inhale 2 puffs twice daily plus 1-2 puffs as needed. May use up to 12 puffs per day. 20.4 g 11    cloNIDine (CATAPRES) 0.1 MG tablet Take 1 tablet (0.1 mg) by mouth 2 times daily. 90 tablet 3    fluticasone (FLONASE) 50 MCG/ACT nasal spray Spray 1 spray into both nostrils daily. 11.1 mL 3    Melatonin 3 MG TBDP Take 3 mg by mouth at bedtime.      SUMAtriptan (IMITREX) 50 MG tablet Take 1 tablet (50 mg) by mouth at onset of headache for migraine. May repeat in 2 hours. Max 4 tablets/24 hours. 30 tablet 1    testosterone (ANDROGEL 1.62 % PUMP) 20.25 MG/ACT gel Place 3 Pump (60.75 mg) onto the skin daily Apply from dispenser to clean, dry, intact skin of the upper arms and shoulders. 75 g 6    Testosterone 1.62 % GEL PLACE 3 PUMPS ONTO THE SKIN DAILY. APPLY TO CLEAN, DRY, INTACT SKIN OF THE UPPER ARMS AND SHOULDERS 75 g 3    tretinoin (RETIN-A) 0.05 % external cream Apply topically at bedtime. 45 g 3    Vitamin D, Cholecalciferol, 25 MCG (1000 UT) CAPS Take 1 tablet by mouth daily. 90 capsule 3    zolpidem (AMBIEN) 5 MG tablet Take tablet by mouth 15 minutes prior to sleep, for Sleep Study 1 tablet 0       No Known Allergies     Social History     Tobacco Use    Smoking status: Never     Passive exposure: Never    Smokeless tobacco: Never   Substance Use Topics    Alcohol use: Not on file     Family History   Problem Relation Age of Onset    Diabetes Type 2  Father     Hypertension Father     Glaucoma No family hx of     Macular Degeneration No family hx of      History   Drug Use Not on file         Review of Systems  Constitutional, neuro,  "ENT, endocrine, pulmonary, cardiac, gastrointestinal, genitourinary, musculoskeletal, integument and psychiatric systems are negative, except as otherwise noted.    Objective    /84   Pulse 100   Temp 98.1  F (36.7  C) (Oral)   Resp 20   Ht 1.702 m (5' 7\")   SpO2 97%   BMI 32.94 kg/m     Estimated body mass index is 32.94 kg/m  as calculated from the following:    Height as of this encounter: 1.702 m (5' 7\").    Weight as of 1/15/25: 95.4 kg (210 lb 4.8 oz).    Physical Exam  GENERAL: alert and no distress  EYES: Eyes grossly normal to inspection, conjunctivae and sclerae normal  HENT: nose and mouth without ulcers or lesions. Mallampti Class IV  NECK: no adenopathy, no asymmetry, masses, or scars  RESP: lungs clear to auscultation - no rales, rhonchi or wheezes  CV: regular rate and rhythm, normal S1 S2, no S3 or S4, no murmur, click or rub, no peripheral edema  ABDOMEN: soft, nontender, no hepatosplenomegaly, no masses  MS: no gross musculoskeletal defects noted, no edema  SKIN: no suspicious lesions or rashes  NEURO: Normal strength and tone, mentation intact and speech normal  PSYCH: mentation appears normal, affect normal/bright    Recent Labs   Lab Test 01/08/25  1140   HGB 15.8         Diagnostics  No labs were ordered during this visit.   No EKG required for low risk surgery (cataract, skin procedure, breast biopsy, etc).    Revised Cardiac Risk Index (RCRI)  The patient has the following serious cardiovascular risks for perioperative complications:   - No serious cardiac risks = 0 points     RCRI Interpretation: 0 points: Class I (very low risk - 0.4% complication rate)     Signed Electronically by: Neyda Espinosa DO, MPH  A copy of this evaluation report is provided to the requesting physician.  "

## 2025-01-22 NOTE — PATIENT INSTRUCTIONS
How to Take Your Medication Before Surgery  Preoperative Medication Instructions   Antiplatelet or Anticoagulation Medication Instructions   - Patient is on no antiplatelet or anticoagulation medications.    Additional Medication Instructions  Take all scheduled medications on the day of surgery EXCEPT for modifications listed below:  Hold sumatriptan on day of surgery.  Clonidine may be continued without modification, per guidelines, given heart rate > 60 and systolic blood pressure > 100 at this encounter.   Per current guidelines, testosterone may be continued without modification.    Patient Education   Preparing for Your Surgery  For Adults  Getting started  In most cases, a nurse will call to review your health history and instructions. They will give you an arrival time based on your scheduled surgery time. Please be ready to share:  Your doctor's clinic name and phone number  Your medical, surgical, and anesthesia history  A list of allergies and sensitivities  A list of medicines, including herbal treatments and over-the-counter drugs  Whether the patient has a legal guardian (ask how to send us the papers in advance)  Note: You may not receive a call if you were seen at our PAC (Preoperative Assessment Center).  Please tell us if you're pregnant--or if there's any chance you might be pregnant. Some surgeries may injure a fetus (unborn baby), so they require a pregnancy test. Surgeries that are safe for a fetus don't always need a test, and you can choose whether to have one.   Preparing for surgery  Within 10 to 30 days of surgery: Have a pre-op exam (sometimes called an H&P, or History and Physical). This can be done at a clinic or pre-operative center.  If you're having a , you may not need this exam. Talk to your care team.  At your pre-op exam, talk to your care team about all medicines you take. (This includes CBD oil and any drugs, such as THC, marijuana, and other forms of cannabis.) If you  need to stop any medicine before surgery, ask when to start taking it again.  This is for your safety. Many medicines and drugs can make you bleed too much during surgery. Some change how well surgery (anesthesia) drugs work.  Call your insurance company to let them know you're having surgery. (If you don't have insurance, call [phone].)  Call your clinic if there's any change in your health. This includes a scrape or scratch near the surgery site, or any signs of a cold (sore throat, runny nose, cough, rash, fever).  Eating and drinking guidelines  For your safety: Unless your surgeon tells you otherwise, follow the guidelines below.  Eat and drink as normal until 8 hours before you arrive for surgery. After that, no food or milk. You can spit out gum when you arrive.  Drink clear liquids until 2 hours before you arrive. These are liquids you can see through, like water, Gatorade, and Propel Water. They also include plain black coffee and tea (no cream or milk).  No alcohol for 24 hours before you arrive. The night before surgery, stop any drinks that contain THC.  If your care team tells you to take medicine on the morning of surgery, it's okay to take it with a sip of water. No other medicines or drugs are allowed (including CBD oil)--follow your care team's instructions.  If you have questions the day of surgery, call your hospital or surgery center.   Preventing infection  Shower or bathe the night before and the morning of surgery. Follow the instructions your clinic gave you. (If no instructions, use regular soap.)  Don't shave or clip hair near your surgery site. We'll remove the hair if needed.  Don't smoke or vape the morning of surgery. No chewing tobacco for 6 hours before you arrive. A nicotine patch is okay. You may spit out nicotine gum when you arrive.  For some surgeries, the surgeon will tell you to fully quit smoking and nicotine.  We will make every effort to keep you safe from infection. We  will:  Clean our hands often with soap and water (or an alcohol-based hand rub).  Clean the skin at your surgery site with a special soap that kills germs.  Give you a special gown to keep you warm. (Cold raises the risk of infection.)  Wear hair covers, masks, gowns, and gloves during surgery.  Give antibiotic medicine, if prescribed. Not all surgeries need this medicine.  What to bring on the day of surgery  Photo ID and insurance card  Copy of your health care directive, if you have one  Glasses and hearing aids (bring cases)  You can't wear contacts during surgery  Inhaler and eye drops, if you use them (tell us about these when you arrive)  CPAP machine or breathing device, if you use them  A few personal items, if spending the night  If you have . . .  A pacemaker, ICD (cardiac defibrillator), or other implant: Bring the ID card.  An implanted stimulator: Bring the remote control.  A legal guardian: Bring a copy of the certified (court-stamped) guardianship papers.  Please remove any jewelry, including body piercings. Leave jewelry and other valuables at home.  If you're going home the day of surgery  You must have a responsible adult drive you home. They should stay with you overnight as well.  If you don't have someone to stay with you, and you aren't safe to go home alone, we may keep you overnight. Insurance often won't pay for this.  After surgery  If it's hard to control your pain or you need more pain medicine, please call your surgeon's office.  Questions?   If you have any questions for your care team, list them here:   ____________________________________________________________________________________________________________________________________________________________________________________________________________________________________________________________  For informational purposes only. Not to replace the advice of your health care provider. Copyright   2003, 2019 [facility]  Services. All rights reserved. Clinically reviewed by John Salas MD. [employer] [id number] - REV 08/24.       CC successfully faxed Pre-op note to [facility] (F: [phone]) Anaya ANTUNEZ

## 2025-01-29 ENCOUNTER — THERAPY VISIT (OUTPATIENT)
Dept: PHYSICAL THERAPY | Facility: REHABILITATION | Age: 20
End: 2025-01-29
Attending: FAMILY MEDICINE
Payer: COMMERCIAL

## 2025-01-29 DIAGNOSIS — M54.6 BILATERAL THORACIC BACK PAIN, UNSPECIFIED CHRONICITY: Primary | ICD-10-CM

## 2025-01-29 PROCEDURE — 97110 THERAPEUTIC EXERCISES: CPT | Mod: GP | Performed by: PHYSICAL THERAPIST

## 2025-02-12 PROCEDURE — 88305 TISSUE EXAM BY PATHOLOGIST: CPT | Mod: TC,ORL | Performed by: PLASTIC SURGERY

## 2025-02-12 PROCEDURE — 88305 TISSUE EXAM BY PATHOLOGIST: CPT | Mod: 26 | Performed by: PATHOLOGY

## 2025-02-13 ENCOUNTER — LAB REQUISITION (OUTPATIENT)
Dept: LAB | Facility: CLINIC | Age: 20
End: 2025-02-13
Payer: COMMERCIAL

## 2025-02-17 LAB
PATH REPORT.COMMENTS IMP SPEC: NORMAL
PATH REPORT.COMMENTS IMP SPEC: NORMAL
PATH REPORT.FINAL DX SPEC: NORMAL
PATH REPORT.GROSS SPEC: NORMAL
PATH REPORT.MICROSCOPIC SPEC OTHER STN: NORMAL
PATH REPORT.RELEVANT HX SPEC: NORMAL
PHOTO IMAGE: NORMAL

## 2025-02-26 ENCOUNTER — THERAPY VISIT (OUTPATIENT)
Dept: PHYSICAL THERAPY | Facility: REHABILITATION | Age: 20
End: 2025-02-26
Payer: COMMERCIAL

## 2025-02-26 DIAGNOSIS — M54.6 BILATERAL THORACIC BACK PAIN, UNSPECIFIED CHRONICITY: Primary | ICD-10-CM

## 2025-02-26 PROCEDURE — 97110 THERAPEUTIC EXERCISES: CPT | Mod: GP | Performed by: PHYSICAL THERAPIST

## 2025-02-26 NOTE — PROGRESS NOTES
[facility]                                                                                   OUTPATIENT PHYSICAL THERAPY    PLAN OF TREATMENT FOR OUTPATIENT REHABILITATION   Patient's Last Name, First Name, Susan Escudero YOB: 2005   Provider's Name   [facility]   Medical Record No.  [id number]     Onset Date: 01/07/25 (onset within the last year)  Start of Care Date: 01/07/25     Medical Diagnosis:  Chronic bilateral low back pain without sciatica      PT Treatment Diagnosis:  thoracic spine pain, B hip and knee pain Plan of Treatment  Frequency/Duration: 1x/week/ 6 weeks    Certification date from 02/19/25 to 04/02/25         See note for plan of treatment details and functional goals     Michelle Bay, PT                         I CERTIFY THE NEED FOR THESE SERVICES FURNISHED UNDER        THIS PLAN OF TREATMENT AND WHILE UNDER MY CARE     (Physician attestation of this document indicates review and certification of the therapy plan).              Referring Provider:  Shaan Castro    Initial Assessment  See Epic Evaluation- Start of Care Date: 01/07/25 02/26/25 0500   Appointment Info   Signing clinician's name / credentials Michelle Bay PT, DPT   Total/Authorized Visits E&T (6 planned)   Visits Used 4   Medical Diagnosis Chronic bilateral low back pain without sciatica   PT Tx Diagnosis thoracic spine pain, B hip and knee pain   Progress Note/Certification   Start of Care Date 01/07/25   Onset of illness/injury or Date of Surgery 01/07/25  (onset within the last year)   Therapy Frequency 1x/week   Predicted Duration 6 weeks   Certification date from 02/19/25   Certification date to 04/02/25   Progress Note Completed Date 01/07/25   GOALS   PT Goals 2   PT Goal 1   Goal Identifier HEP   Goal Description Pt will demonstrate mastery of HEP, completing at least 5x/week, without need for additional cuing in order to  increase independence with self cares and self management of the condition.   Goal Progress 2/26: pt has been avoiding HEP until cleared by surgical team   Target Date 04/02/25   PT Goal 2   Goal Identifier Prolonged Positions   Goal Description Pt will be able to sit >60 min or stand >60 min without increase in symptoms <2/10 NRS   Rationale to maximize safety and independence with performance of ADLs and functional tasks;to maximize safety and independence within the home;to maximize safety and independence within the community;to maximize safety and independence with transportation;to maximize safety and independence with self cares   Goal Progress 2/26: pt recently had top surgery so with recovery has noticed an increaesd pain with theses prolonged position   Target Date 04/02/25   Subjective Report   Subjective Report Pt recently had top surgery and has been cleared to return to prior level of activity. His upper back has been a little sore recently especially with walking and standing.   Objective Measures   Objective Measures Objective Measure 1   Treatment Interventions (PT)   Interventions Therapeutic Procedure/Exercise   Therapeutic Procedure/Exercise   Therapeutic Procedures: strength, endurance, ROM, flexibility minutes (86315) 33   Therapeutic Procedures Ther Proc 2;Ther Proc 3;Ther Proc 4;Ther Proc 5   Ther Proc 1 Row   Ther Proc 1 - Details GTB x 20 reps   Ther Proc 2 Straight arm pull down   Ther Proc 2 - Details GTB x 20 reps   Ther Proc 3 Thoracic extrension over chair   Ther Proc 3 - Details x 10 reps   Ther Proc 4 Squat tap down   Ther Proc 4 - Details x 10 reps   PTRx Ther Proc 1 Supine Hamstring Stretch   PTRx Ther Proc 1 - Details NT   PTRx Ther Proc 2 [place] and [place] Stretch   PTRx Ther Proc 2 - Details Reviewed x 10 reps B    PTRx Ther Proc 3 Bridging #1   PTRx Ther Proc 3 - Details Reviewed 10 sec holds x 7 reps   PTRx Ther Proc 4 Bridging #2A Weight Shift   PTRx Ther Proc 4 - Details NT;  easing back in to exercise    PTRx Ther Proc 5 Scapular Retraction/Depression   PTRx Ther Proc 5 - Details VR to continue for postural awareness    PTRx Ther Proc 6 Shoulder Horizontal Abduction/Diagonals With Theraband   PTRx Ther Proc 6 - Details NT - completed rows and pull downs    PTRx Ther Proc 7 Supine Abdominal Exercise #8 (Toe Taps)   PTRx Ther Proc 7 - Details Reviewed x 20 reps    Skilled Intervention Assessment of strength and functional deficits to determine exercise prescription; tactile and verbal cuing to assist with proper performance; education in loading principles and expected response   Patient Response/Progress tolerated well   PTRx Ther Proc 8 Hip Abduction Straight Leg Raise   PTRx Ther Proc 8 - Details Reviewed x 20 reps B   Ther Proc 5 NuStep   Ther Proc 5 - Details level 5 x 5 min; warm up   Therapeutic Activity   PTRx Ther Act 1 Posture Correction with Lumbar Roll   PTRx Ther Act 1 - Details VR   Education   Learner/Method Patient   Education Comments Eager to participate in therapy   Plan   Home program See PTRX - link on phone   Plan for next session reiew thoracic rotation stretches, progress scap stabs, progress lower body/ex for hips/knees   Total Session Time   Timed Code Treatment Minutes 33   Total Treatment Time (sum of timed and untimed services) 33

## 2025-03-11 ENCOUNTER — THERAPY VISIT (OUTPATIENT)
Dept: PHYSICAL THERAPY | Facility: REHABILITATION | Age: 20
End: 2025-03-11
Payer: COMMERCIAL

## 2025-03-11 DIAGNOSIS — M54.6 BILATERAL THORACIC BACK PAIN, UNSPECIFIED CHRONICITY: Primary | ICD-10-CM

## 2025-04-02 ENCOUNTER — THERAPY VISIT (OUTPATIENT)
Dept: PHYSICAL THERAPY | Facility: REHABILITATION | Age: 20
End: 2025-04-02
Payer: COMMERCIAL

## 2025-04-02 DIAGNOSIS — M54.6 BILATERAL THORACIC BACK PAIN, UNSPECIFIED CHRONICITY: Primary | ICD-10-CM

## 2025-04-02 PROCEDURE — 97110 THERAPEUTIC EXERCISES: CPT | Mod: GP | Performed by: PHYSICAL THERAPIST

## 2025-04-02 NOTE — PROGRESS NOTES
DISCHARGE  Reason for Discharge: Patient has met all goals.  Pt is feeling overall much better, rating recovery at 8/10.     Equipment Issued: NA    Discharge Plan: Patient to continue home program.    Referring Provider:  Shaan Castro       04/02/25 0500   Appointment Info   Signing clinician's name / credentials Michelle Farhato, PT, DPT   Total/Authorized Visits E&T (6 planned)   Visits Used 6   Medical Diagnosis Chronic bilateral low back pain without sciatica   PT Tx Diagnosis thoracic spine pain, B hip and knee pain   Progress Note/Certification   Start of Care Date 01/07/25   Onset of illness/injury or Date of Surgery 01/07/25  (onset within the last year)   Therapy Frequency 1x/week   Predicted Duration 6 weeks   Certification date from 02/19/25   Certification date to 04/02/25   Progress Note Completed Date 01/07/25   GOALS   PT Goals 2   PT Goal 1   Goal Identifier HEP   Goal Description Pt will demonstrate mastery of HEP, completing at least 5x/week, without need for additional cuing in order to increase independence with self cares and self management of the condition.   Goal Progress 2/26: pt has been avoiding HEP until cleared by surgical team   Target Date 04/02/25   Date Met 04/02/25   PT Goal 2   Goal Identifier Prolonged Positions   Goal Description Pt will be able to sit >60 min or stand >60 min without increase in symptoms <2/10 NRS   Rationale to maximize safety and independence with performance of ADLs and functional tasks;to maximize safety and independence within the home;to maximize safety and independence within the community;to maximize safety and independence with transportation;to maximize safety and independence with self cares   Target Date 04/02/25   Date Met 04/02/25   Subjective Report   Subjective Report Pt is feeling good overall. He is feeling ready to DC. Pt rates recovery 8/10   Objective Measures   Objective Measures Objective Measure 1   Treatment Interventions (PT)    Interventions Therapeutic Procedure/Exercise   Therapeutic Procedure/Exercise   Therapeutic Procedures Ther Proc 2;Ther Proc 3;Ther Proc 4;Ther Proc 5   Ther Proc 1 NuStep   Ther Proc 1 - Details level 6 x 5 min; warm up   Ther Proc 2 Squat tap down   Ther Proc 2 - Details 2 x 10 reps   Ther Proc 3 standing flexion stretch with hands on railing   Ther Proc 3 - Details 20 sec holds x 2 reps   Ther Proc 4 Cat cow   Ther Proc 4 - Details x 10 reps   Ther Proc 5 deejay pose   Ther Proc 5 - Details 30 sec holds - side stretch as well   PTRx Ther Proc 1 Supine Hamstring Stretch   PTRx Ther Proc 1 - Details VR to continue for flexibility    PTRx Ther Proc 2 [place] Stretch   PTRx Ther Proc 2 - Details Reviewed x 10 reps B    PTRx Ther Proc 3 Bridging #1   PTRx Ther Proc 3 - Details Reviewed 5 sec holds x 10 reps; arms in the air    PTRx Ther Proc 4 Bridging #2A Weight Shift   PTRx Ther Proc 4 - Details VR to continue for extra challenge    PTRx Ther Proc 5 Scapular Retraction/Depression   PTRx Ther Proc 5 - Details VR to continue for postural awareness    PTRx Ther Proc 6 Shoulder Horizontal Abduction/Diagonals With Theraband   PTRx Ther Proc 6 - Details Reviewed x 10 reps regular then x 5 reps at each diagonal    PTRx Ther Proc 7 Supine Abdominal Exercise #8 (Toe Taps)   PTRx Ther Proc 7 - Details Reviewed x 20 reps; arms overhead today    PTRx Ther Proc 8 Hip Abduction Straight Leg Raise   PTRx Ther Proc 8 - Details Reviewed x 20 reps B   PTRx Ther Proc 9 birddog   PTRx Ther Proc 9 - Details Reviewed 5 sec holds x 10 reps    Skilled Intervention Assessment of strength and functional deficits to determine exercise prescription; tactile and verbal cuing to assist with proper performance; education in loading principles and expected response   Patient Response/Progress tolerated well   Therapeutic Activity   PTRx Ther Act 1 Posture Correction with Lumbar Roll   PTRx Ther Act 1 - Details VR- pt uses a towel roll     Education   Learner/Method Patient   Education Comments Eager to participate in therapy   Plan   Home program See PTRX - link on phone   Plan for next session DC'd today

## 2025-04-09 ENCOUNTER — MYC REFILL (OUTPATIENT)
Dept: FAMILY MEDICINE | Facility: CLINIC | Age: 20
End: 2025-04-09
Payer: COMMERCIAL

## 2025-04-09 DIAGNOSIS — F64.9 GENDER DYSPHORIA: ICD-10-CM

## 2025-04-10 NOTE — TELEPHONE ENCOUNTER
"Request for medication refill:    Medication Name: Testosterone 1.62 % GEL     Providers if patient needs an appointment and you are willing to give a one month supply please refill for one month and  send a MyChart using \".SMILLIMITEDREFILL\" .Or route chart to \"P SMI \" . To call patient and inform of limited refill and providers request to make an appointment. (Giving one month refill in non controlled medications is strongly recommended before denial)    If refill has been denied, meaning absolutely no refills without visit, please complete the smart phrase \".SMIRXREFUSE\" and route it to the \"P SMI MED REFILLS\"  pool to inform the patient and the pharmacy.    Leticia Lopez RN      "

## 2025-04-13 RX ORDER — TESTOSTERONE 20.25 MG/1.25G
GEL TOPICAL
Qty: 75 G | Refills: 0 | Status: SHIPPED | OUTPATIENT
Start: 2025-04-13

## 2025-04-14 RX ORDER — TESTOSTERONE 1.62 MG/G
3 GEL TRANSDERMAL DAILY
Qty: 75 G | Refills: 6 | Status: SHIPPED | OUTPATIENT
Start: 2025-04-14

## 2025-04-22 DIAGNOSIS — F41.1 GENERALIZED ANXIETY DISORDER: ICD-10-CM

## 2025-04-22 DIAGNOSIS — F99 INSOMNIA DUE TO OTHER MENTAL DISORDER: ICD-10-CM

## 2025-04-22 DIAGNOSIS — F51.05 INSOMNIA DUE TO OTHER MENTAL DISORDER: ICD-10-CM

## 2025-04-22 RX ORDER — CLONIDINE HYDROCHLORIDE 0.1 MG/1
0.1 TABLET ORAL
Qty: 90 TABLET | Refills: 3 | Status: SHIPPED | OUTPATIENT
Start: 2025-04-22

## 2025-04-22 NOTE — TELEPHONE ENCOUNTER
"Request for medication refill:    Medication Name: cloNIDine (CATAPRES) 0.1 MG tablet     Providers if patient needs an appointment and you are willing to give a one month supply please refill for one month and  send a MyChart using \".SMILLIMITEDREFILL\" .Or route chart to \"P [facility] \" . To call patient and inform of limited refill and providers request to make an appointment. (Giving one month refill in non controlled medications is strongly recommended before denial)    If refill has been denied, meaning absolutely no refills without visit, please complete the smart phrase \".SMIRXREFUSE\" and route it to the \"P [facility] MED REFILLS\"  pool to inform the patient and the pharmacy.    Leticia Lopez RN      "

## 2025-05-12 ENCOUNTER — MYC MEDICAL ADVICE (OUTPATIENT)
Dept: FAMILY MEDICINE | Facility: CLINIC | Age: 20
End: 2025-05-12

## 2025-05-12 DIAGNOSIS — F64.9 GENDER DYSPHORIA: ICD-10-CM

## 2025-05-14 RX ORDER — TESTOSTERONE 1.62 MG/G
3 GEL TRANSDERMAL DAILY
Qty: 75 G | Refills: 6 | Status: SHIPPED | OUTPATIENT
Start: 2025-05-14

## 2025-06-05 ENCOUNTER — OFFICE VISIT (OUTPATIENT)
Dept: FAMILY MEDICINE | Facility: CLINIC | Age: 20
End: 2025-06-05
Payer: MEDICAID

## 2025-06-05 VITALS
DIASTOLIC BLOOD PRESSURE: 77 MMHG | HEART RATE: 84 BPM | RESPIRATION RATE: 18 BRPM | HEIGHT: 67 IN | WEIGHT: 210.7 LBS | TEMPERATURE: 98.1 F | BODY MASS INDEX: 33.07 KG/M2 | SYSTOLIC BLOOD PRESSURE: 111 MMHG | OXYGEN SATURATION: 100 %

## 2025-06-05 DIAGNOSIS — F64.9 GENDER DYSPHORIA: Primary | ICD-10-CM

## 2025-06-05 DIAGNOSIS — R43.0 ANOSMIA: ICD-10-CM

## 2025-06-05 RX ORDER — TESTOSTERONE CYPIONATE 200 MG/ML
60 INJECTION, SOLUTION INTRAMUSCULAR WEEKLY
Qty: 5 ML | Refills: 3 | Status: SHIPPED | OUTPATIENT
Start: 2025-06-05

## 2025-06-05 ASSESSMENT — ASTHMA QUESTIONNAIRES
QUESTION_5 LAST FOUR WEEKS HOW WOULD YOU RATE YOUR ASTHMA CONTROL: WELL CONTROLLED
QUESTION_1 LAST FOUR WEEKS HOW MUCH OF THE TIME DID YOUR ASTHMA KEEP YOU FROM GETTING AS MUCH DONE AT WORK, SCHOOL OR AT HOME: NONE OF THE TIME
QUESTION_4 LAST FOUR WEEKS HOW OFTEN HAVE YOU USED YOUR RESCUE INHALER OR NEBULIZER MEDICATION (SUCH AS ALBUTEROL): TWO OR THREE TIMES PER WEEK
QUESTION_2 LAST FOUR WEEKS HOW OFTEN HAVE YOU HAD SHORTNESS OF BREATH: ONCE OR TWICE A WEEK
QUESTION_3 LAST FOUR WEEKS HOW OFTEN DID YOUR ASTHMA SYMPTOMS (WHEEZING, COUGHING, SHORTNESS OF BREATH, CHEST TIGHTNESS OR PAIN) WAKE YOU UP AT NIGHT OR EARLIER THAN USUAL IN THE MORNING: NOT AT ALL
ACT_TOTALSCORE: 21

## 2025-06-05 ASSESSMENT — ANXIETY QUESTIONNAIRES
GAD7 TOTAL SCORE: 7
7. FEELING AFRAID AS IF SOMETHING AWFUL MIGHT HAPPEN: SEVERAL DAYS
1. FEELING NERVOUS, ANXIOUS, OR ON EDGE: SEVERAL DAYS
GAD7 TOTAL SCORE: 7
GAD7 TOTAL SCORE: 7
6. BECOMING EASILY ANNOYED OR IRRITABLE: SEVERAL DAYS
4. TROUBLE RELAXING: SEVERAL DAYS
7. FEELING AFRAID AS IF SOMETHING AWFUL MIGHT HAPPEN: SEVERAL DAYS
IF YOU CHECKED OFF ANY PROBLEMS ON THIS QUESTIONNAIRE, HOW DIFFICULT HAVE THESE PROBLEMS MADE IT FOR YOU TO DO YOUR WORK, TAKE CARE OF THINGS AT HOME, OR GET ALONG WITH OTHER PEOPLE: SOMEWHAT DIFFICULT
2. NOT BEING ABLE TO STOP OR CONTROL WORRYING: NOT AT ALL
5. BEING SO RESTLESS THAT IT IS HARD TO SIT STILL: MORE THAN HALF THE DAYS
3. WORRYING TOO MUCH ABOUT DIFFERENT THINGS: SEVERAL DAYS
8. IF YOU CHECKED OFF ANY PROBLEMS, HOW DIFFICULT HAVE THESE MADE IT FOR YOU TO DO YOUR WORK, TAKE CARE OF THINGS AT HOME, OR GET ALONG WITH OTHER PEOPLE?: SOMEWHAT DIFFICULT

## 2025-06-05 ASSESSMENT — PATIENT HEALTH QUESTIONNAIRE - PHQ9
10. IF YOU CHECKED OFF ANY PROBLEMS, HOW DIFFICULT HAVE THESE PROBLEMS MADE IT FOR YOU TO DO YOUR WORK, TAKE CARE OF THINGS AT HOME, OR GET ALONG WITH OTHER PEOPLE: NOT DIFFICULT AT ALL
SUM OF ALL RESPONSES TO PHQ QUESTIONS 1-9: 5
SUM OF ALL RESPONSES TO PHQ QUESTIONS 1-9: 5

## 2025-06-05 NOTE — PROGRESS NOTES
"  Assessment & Plan     Gender dysphoria  S/P top surgery   Patient initiated mHRT 3/2023 with topical testosterone. Labs 1/2025 with mild HLD, otherwise reassuring. Interested in switching to injections due to cost barrier. Will transition to medium dose injection today. Patient also requested referral for hysterectomy for menstruation control.   - STOP Androgen 3 pumps daily   - START  testosterone cypionate (DEPOTESTOSTERONE) 200 MG/ML injection; Inject 0.3 mLs (60 mg) into the muscle once a week.  - Needle, Disp, 25G X 1-1/2\" MISC; Use once weekly for administering hormone IM  - needle, disp, 18G X 1\" MISC; Use to draw up hormones once weekly  - syringe, disposable, 1 ML MISC; Use once weekly to draw up hormones  - Comprehensive Gender Care Referral; Future  - Return for RN shot teaching appointment; message sent to RN   - Return in 1 month for recheck; discuss problems with injections or side effects   - Return in 3 months for GAHT labs     Anosmia  Chronic. No improvement with Flonase. Interested in olfactory training.   - Occupational Therapy  Referral; Future    Subjective   Roby is a 19 year old, presenting for the following health issues:  Recheck Medication    HPI      GAHT:  - Initiated mHRT 3/2023 with topical testosterone.  - Current regimen: Androgel 3 pumps daily  - Labs 1/2025 normal.   - Androgel is very expensive. Wants to switch to injections.   - Menstruation control: Still has bleeding, wants to talk about options. Not birth control.   - Had top surgery. Recovering well.             Objective    /77   Pulse 84   Temp 98.1  F (36.7  C) (Temporal)   Resp 18   Ht 1.702 m (5' 7\")   Wt 95.6 kg (210 lb 11.2 oz)   SpO2 100%   BMI 33.00 kg/m    Body mass index is 33 kg/m .  Physical Exam  Vitals reviewed.   Constitutional:       General: He is not in acute distress.     Appearance: Normal appearance.   HENT:      Head: Normocephalic and atraumatic.   Cardiovascular:      Rate and " Rhythm: Normal rate and regular rhythm.      Heart sounds: No murmur heard.  Pulmonary:      Effort: Pulmonary effort is normal. No respiratory distress.      Breath sounds: No wheezing.   Musculoskeletal:      Right lower leg: No edema.      Left lower leg: No edema.   Skin:     General: Skin is warm and dry.      Capillary Refill: Capillary refill takes less than 2 seconds.   Neurological:      General: No focal deficit present.      Mental Status: He is alert.   Psychiatric:         Mood and Affect: Mood normal.         Behavior: Behavior normal.                    Signed Electronically by: Valentina Junior MD    Answers submitted by the patient for this visit:  Patient Health Questionnaire (Submitted on 6/5/2025)  If you checked off any problems, how difficult have these problems made it for you to do your work, take care of things at home, or get along with other people?: Not difficult at all  PHQ9 TOTAL SCORE: 5  Patient Health Questionnaire (G7) (Submitted on 6/5/2025)  LEMUEL 7 TOTAL SCORE: 7

## 2025-06-05 NOTE — PROGRESS NOTES
Preceptor Attestation:   Patient seen, evaluated and discussed with the resident. I have verified the content of the note, which accurately reflects my assessment of the patient and the plan of care.   Supervising Physician:  Godfrey Rivera MD

## 2025-06-05 NOTE — PATIENT INSTRUCTIONS
Patient Education   Here is the plan from today's visit    FOR GENDER CARE:  - STOP ANDROGEL  -  SUPPLIES FOR INJECTIONS   - COME BACK FOR SHOT TEACHING   - CONTINUE INJECTIONS ONCE WEEKLY   - RETURN IN 1 MONTH FOR RECHECK (SIDE EFFECTS, PROBLEMS)  - RETURN IN 3 MONTH FOR LABS  - THEY WILL CALL REGARDING SURGERY     FOR SMELL:  - SOMEONE SHOULD CALL TO SCHEDULE OLFACTORY TRAINING         Please call or return to clinic if your symptoms don't go away.    Follow up plan  Return in about 4 weeks (around 7/3/2025).    Thank you for coming to [facility] today.  Lab Testing:  **If you had lab testing today and your results are reassuring or normal they will be mailed to you or sent through [employer] within 7 days.   **If the lab tests need quick action we will call you with the results.  **If you are having labs done on a different day, please call [phone] to schedule at [facility] or [phone] for other [facility] locations. Labs do not offer walk-in appointments.  The phone number we will call with results is # [phone] (home) . If this is not the best number please call our clinic and change the number.  Medication Refills:  If you need any refills please call your pharmacy and they will contact us.   If you need to  your refill at a new pharmacy, please contact the new pharmacy directly. The new pharmacy will help you get your medications transferred faster.   Scheduling:  If you have any concerns about today's visit or wish to schedule another appointment please call our office during normal business hours [phone] (8-5:00 M-F). If you can no longer make a scheduled visit, please cancel via [employer] or call us to cancel.   If a referral was made to an [facility] specialty provider and you do not get a call from central scheduling, please refer to directions on your visit summary or call our office during normal business hours for assistance.   If a  Mammogram was ordered for you at the [facility] call [phone] to schedule or change your appointment.  If you had an XRay/CT/Ultrasound/MRI ordered the number is [phone] to schedule or change your radiology appointment.   [facility] has limited ultrasound appointments available on Wednesdays, if you would like your ultrasound at [facility], please call [phone] to schedule.   Medical Concerns:  If you have urgent medical concerns please call [phone] at any time of the day.    Valentina Junior MD